# Patient Record
Sex: FEMALE | Race: WHITE | NOT HISPANIC OR LATINO | URBAN - METROPOLITAN AREA
[De-identification: names, ages, dates, MRNs, and addresses within clinical notes are randomized per-mention and may not be internally consistent; named-entity substitution may affect disease eponyms.]

---

## 2017-08-08 ENCOUNTER — OUTPATIENT (OUTPATIENT)
Dept: OUTPATIENT SERVICES | Facility: HOSPITAL | Age: 67
LOS: 1 days | Discharge: HOME | End: 2017-08-08

## 2017-08-08 DIAGNOSIS — E78.5 HYPERLIPIDEMIA, UNSPECIFIED: ICD-10-CM

## 2017-08-08 DIAGNOSIS — I10 ESSENTIAL (PRIMARY) HYPERTENSION: ICD-10-CM

## 2017-08-08 DIAGNOSIS — E55.9 VITAMIN D DEFICIENCY, UNSPECIFIED: ICD-10-CM

## 2017-08-21 ENCOUNTER — APPOINTMENT (OUTPATIENT)
Dept: HEMATOLOGY ONCOLOGY | Facility: CLINIC | Age: 67
End: 2017-08-21

## 2017-08-21 VITALS
BODY MASS INDEX: 29.25 KG/M2 | HEIGHT: 66 IN | TEMPERATURE: 97.7 F | RESPIRATION RATE: 14 BRPM | SYSTOLIC BLOOD PRESSURE: 122 MMHG | HEART RATE: 58 BPM | DIASTOLIC BLOOD PRESSURE: 63 MMHG | WEIGHT: 182 LBS

## 2017-08-21 DIAGNOSIS — Z78.9 OTHER SPECIFIED HEALTH STATUS: ICD-10-CM

## 2017-08-21 DIAGNOSIS — H35.30 UNSPECIFIED MACULAR DEGENERATION: ICD-10-CM

## 2017-08-21 DIAGNOSIS — Z87.891 PERSONAL HISTORY OF NICOTINE DEPENDENCE: ICD-10-CM

## 2017-08-21 DIAGNOSIS — Z86.69 PERSONAL HISTORY OF OTHER DISEASES OF THE NERVOUS SYSTEM AND SENSE ORGANS: ICD-10-CM

## 2017-08-21 DIAGNOSIS — K44.9 DIAPHRAGMATIC HERNIA W/OUT OBSTRUCTION OR GANGRENE: ICD-10-CM

## 2017-08-21 DIAGNOSIS — Z80.1 FAMILY HISTORY OF MALIGNANT NEOPLASM OF TRACHEA, BRONCHUS AND LUNG: ICD-10-CM

## 2017-08-21 DIAGNOSIS — Z85.828 PERSONAL HISTORY OF OTHER MALIGNANT NEOPLASM OF SKIN: ICD-10-CM

## 2017-08-21 DIAGNOSIS — K21.9 GASTRO-ESOPHAGEAL REFLUX DISEASE W/OUT ESOPHAGITIS: ICD-10-CM

## 2017-08-21 DIAGNOSIS — Z86.718 PERSONAL HISTORY OF OTHER VENOUS THROMBOSIS AND EMBOLISM: ICD-10-CM

## 2017-08-21 RX ORDER — GABAPENTIN 300 MG/1
300 CAPSULE ORAL
Refills: 0 | Status: ACTIVE | COMMUNITY

## 2017-08-21 RX ORDER — MULTIVITAMIN
TABLET ORAL
Refills: 0 | Status: ACTIVE | COMMUNITY

## 2017-08-21 RX ORDER — VIT C/E/ZN/COPPR/LUTEIN/ZEAXAN 250MG-90MG
CAPSULE ORAL
Refills: 0 | Status: ACTIVE | COMMUNITY

## 2017-08-21 RX ORDER — ROSUVASTATIN CALCIUM 10 MG/1
10 TABLET, FILM COATED ORAL
Refills: 0 | Status: ACTIVE | COMMUNITY

## 2017-08-21 RX ORDER — SERTRALINE HYDROCHLORIDE 25 MG/1
25 TABLET, FILM COATED ORAL
Refills: 0 | Status: ACTIVE | COMMUNITY

## 2017-08-21 RX ORDER — BENAZEPRIL HYDROCHLORIDE 10 MG/1
10 TABLET ORAL
Refills: 0 | Status: ACTIVE | COMMUNITY

## 2017-08-22 LAB
BASOPHILS # BLD: 0.02 TH/MM3
BASOPHILS NFR BLD: 0.2 %
EOSINOPHIL # BLD: 0.08 TH/MM3
EOSINOPHIL NFR BLD: 1 %
ERYTHROCYTE [DISTWIDTH] IN BLOOD BY AUTOMATED COUNT: 13.8 %
GRANULOCYTES # BLD: 5.69 TH/MM3
GRANULOCYTES NFR BLD: 68.2 %
HCT VFR BLD AUTO: 37.1 %
HGB BLD-MCNC: 11.9 G/DL
IMM GRANULOCYTES # BLD: 0.01 TH/MM3
IMM GRANULOCYTES NFR BLD: 0.1 %
LACTATE DEHYDROGENASE (NORTH): 226 IU/L
LYMPHOCYTES # BLD: 1.64 TH/MM3
LYMPHOCYTES NFR BLD: 19.7 %
MCH RBC QN AUTO: 28.7 PG
MCHC RBC AUTO-ENTMCNC: 32.1 G/DL
MCV RBC AUTO: 89.6 FL
MONOCYTES # BLD: 0.9 TH/MM3
MONOCYTES NFR BLD: 10.8 %
PLATELET # BLD: 250 TH/MM3
PMV BLD AUTO: 9.2 FL
RBC # BLD AUTO: 4.14 MIL/MM3
WBC # BLD: 8.34 TH/MM3

## 2017-08-25 ENCOUNTER — APPOINTMENT (OUTPATIENT)
Dept: HEMATOLOGY ONCOLOGY | Facility: CLINIC | Age: 67
End: 2017-08-25

## 2017-08-25 ENCOUNTER — RESULT REVIEW (OUTPATIENT)
Age: 67
End: 2017-08-25

## 2017-08-25 ENCOUNTER — OTHER (OUTPATIENT)
Age: 67
End: 2017-08-25

## 2017-08-25 DIAGNOSIS — Z00.00 ENCOUNTER FOR GENERAL ADULT MEDICAL EXAMINATION W/OUT ABNORMAL FINDINGS: ICD-10-CM

## 2017-09-08 LAB
B2 MICROGLOB SERPL-MCNC: 1.4 MG/L
IGA FLD-MCNC: 91 MG/DL
IGG FLD-MCNC: 1160 MG/DL
IGM SERPL-MCNC: 65 MG/DL
KAPPA LC FREE SER-MCNC: 10.5 MG/L
KAPPA LC FREE/LAMBDA FREE SER: 1.36
LAMBDA LC FREE SERPL-MCNC: 7.7 MG/L
PROT PATTERN UR IFE-IMP: NOT DETECTED

## 2017-09-14 ENCOUNTER — OUTPATIENT (OUTPATIENT)
Dept: OUTPATIENT SERVICES | Facility: HOSPITAL | Age: 67
LOS: 1 days | Discharge: HOME | End: 2017-09-14

## 2017-09-14 ENCOUNTER — APPOINTMENT (OUTPATIENT)
Dept: HEMATOLOGY ONCOLOGY | Facility: CLINIC | Age: 67
End: 2017-09-14

## 2017-09-14 VITALS
RESPIRATION RATE: 14 BRPM | DIASTOLIC BLOOD PRESSURE: 65 MMHG | SYSTOLIC BLOOD PRESSURE: 142 MMHG | BODY MASS INDEX: 29.25 KG/M2 | HEART RATE: 58 BPM | TEMPERATURE: 97 F | HEIGHT: 66 IN | WEIGHT: 182 LBS

## 2017-09-14 DIAGNOSIS — D47.2 MONOCLONAL GAMMOPATHY: ICD-10-CM

## 2017-09-15 PROBLEM — D47.2 MONOCLONAL GAMMOPATHY: Status: ACTIVE | Noted: 2017-08-21

## 2017-09-18 DIAGNOSIS — D47.2 MONOCLONAL GAMMOPATHY: ICD-10-CM

## 2017-12-18 ENCOUNTER — TRANSCRIPTION ENCOUNTER (OUTPATIENT)
Age: 67
End: 2017-12-18

## 2018-01-16 ENCOUNTER — RESULT REVIEW (OUTPATIENT)
Age: 68
End: 2018-01-16

## 2018-01-16 ENCOUNTER — OUTPATIENT (OUTPATIENT)
Dept: OUTPATIENT SERVICES | Facility: HOSPITAL | Age: 68
LOS: 1 days | Discharge: HOME | End: 2018-01-16

## 2018-01-16 ENCOUNTER — APPOINTMENT (OUTPATIENT)
Dept: HEMATOLOGY ONCOLOGY | Facility: CLINIC | Age: 68
End: 2018-01-16

## 2018-01-16 VITALS
BODY MASS INDEX: 29.57 KG/M2 | TEMPERATURE: 97.1 F | HEART RATE: 62 BPM | SYSTOLIC BLOOD PRESSURE: 87 MMHG | WEIGHT: 184 LBS | DIASTOLIC BLOOD PRESSURE: 46 MMHG | HEIGHT: 66 IN

## 2018-01-17 DIAGNOSIS — D47.2 MONOCLONAL GAMMOPATHY: ICD-10-CM

## 2018-01-19 LAB
B2 GLYCOPROT1 IGA SER-ACNC: < 9 SAU
B2 GLYCOPROT1 IGG SER-ACNC: < 9 SGU
B2 GLYCOPROT1 IGM SER-ACNC: < 9 SMU
IGA FLD-MCNC: 86 MG/DL
IGG FLD-MCNC: 1250 MG/DL
IGM SERPL-MCNC: 55 MG/DL
KAPPA LC FREE SER-MCNC: 10.6 MG/L
KAPPA LC FREE/LAMBDA FREE SER: 1.38
LACTATE DEHYDROGENASE (NORTH): 178 IU/L
LAMBDA LC FREE SERPL-MCNC: 7.7 MG/L
TSH SERPL DL<=0.005 MIU/L-ACNC: 1 UIU/ML

## 2018-01-31 ENCOUNTER — TRANSCRIPTION ENCOUNTER (OUTPATIENT)
Age: 68
End: 2018-01-31

## 2018-02-19 ENCOUNTER — OUTPATIENT (OUTPATIENT)
Dept: OUTPATIENT SERVICES | Facility: HOSPITAL | Age: 68
LOS: 1 days | Discharge: HOME | End: 2018-02-19

## 2018-02-19 DIAGNOSIS — E78.2 MIXED HYPERLIPIDEMIA: ICD-10-CM

## 2018-02-19 DIAGNOSIS — I10 ESSENTIAL (PRIMARY) HYPERTENSION: ICD-10-CM

## 2018-05-15 ENCOUNTER — APPOINTMENT (OUTPATIENT)
Dept: HEMATOLOGY ONCOLOGY | Facility: CLINIC | Age: 68
End: 2018-05-15

## 2018-05-15 ENCOUNTER — LABORATORY RESULT (OUTPATIENT)
Age: 68
End: 2018-05-15

## 2018-05-15 ENCOUNTER — OUTPATIENT (OUTPATIENT)
Dept: OUTPATIENT SERVICES | Facility: HOSPITAL | Age: 68
LOS: 1 days | Discharge: HOME | End: 2018-05-15

## 2018-05-15 VITALS
TEMPERATURE: 96.7 F | BODY MASS INDEX: 30.37 KG/M2 | HEIGHT: 66 IN | DIASTOLIC BLOOD PRESSURE: 59 MMHG | SYSTOLIC BLOOD PRESSURE: 124 MMHG | HEART RATE: 55 BPM | WEIGHT: 189 LBS | RESPIRATION RATE: 16 BRPM

## 2018-05-15 LAB
HCT VFR BLD CALC: 38.9 %
HGB BLD-MCNC: 12.1 G/DL
MCHC RBC-ENTMCNC: 27.6 PG
MCHC RBC-ENTMCNC: 31.1 G/DL
MCV RBC AUTO: 88.8 FL
PLATELET # BLD AUTO: 258 K/UL
PMV BLD: 9.7 FL
RBC # BLD: 4.38 M/UL
RBC # FLD: 14.1 %
WBC # FLD AUTO: 8.91 K/UL

## 2018-05-16 LAB
ALBUMIN SERPL ELPH-MCNC: 4.5 G/DL
ALP BLD-CCNC: 62 U/L
ALT SERPL-CCNC: 20 U/L
ANION GAP SERPL CALC-SCNC: 14 MMOL/L
AST SERPL-CCNC: 17 U/L
BILIRUB SERPL-MCNC: <0.2 MG/DL
BUN SERPL-MCNC: 13 MG/DL
CALCIUM SERPL-MCNC: 10.4 MG/DL
CHLORIDE SERPL-SCNC: 103 MMOL/L
CO2 SERPL-SCNC: 26 MMOL/L
CREAT SERPL-MCNC: 0.7 MG/DL
FERRITIN SERPL-MCNC: 68 NG/ML
GLUCOSE SERPL-MCNC: 94 MG/DL
IRON SATN MFR SERPL: 29 %
IRON SERPL-MCNC: 94 UG/DL
LDH SERPL-CCNC: 216
POTASSIUM SERPL-SCNC: 5.1 MMOL/L
PROT SERPL-MCNC: 7.2 G/DL
SODIUM SERPL-SCNC: 143 MMOL/L
TIBC SERPL-MCNC: 327 UG/DL
UIBC SERPL-MCNC: 233 UG/DL

## 2018-05-18 LAB
ALBUMIN MFR SERPL ELPH: 59.9 %
ALBUMIN SERPL-MCNC: 4.4 G/DL
ALBUMIN/GLOB SERPL: 1.5 RATIO
ALPHA1 GLOB MFR SERPL ELPH: 4.4 %
ALPHA1 GLOB SERPL ELPH-MCNC: 0.3 G/DL
ALPHA2 GLOB MFR SERPL ELPH: 10 %
ALPHA2 GLOB SERPL ELPH-MCNC: 0.7 G/DL
B-GLOBULIN MFR SERPL ELPH: 9.8 %
B-GLOBULIN SERPL ELPH-MCNC: 0.7 G/DL
GAMMA GLOB FLD ELPH-MCNC: 1.2 G/DL
GAMMA GLOB MFR SERPL ELPH: 15.9 %
INTERPRETATION SERPL IEP-IMP: NORMAL
M PROTEIN MFR SERPL ELPH: 9.5 %
M PROTEIN SPEC IFE-MCNC: NORMAL
MONOCLON BAND OBS SERPL: 0.7 G/DL
PROT SERPL-MCNC: 7.3 G/DL
PROT SERPL-MCNC: 7.3 G/DL

## 2018-05-21 DIAGNOSIS — D47.2 MONOCLONAL GAMMOPATHY: ICD-10-CM

## 2018-07-24 ENCOUNTER — OUTPATIENT (OUTPATIENT)
Dept: OUTPATIENT SERVICES | Facility: HOSPITAL | Age: 68
LOS: 1 days | Discharge: HOME | End: 2018-07-24

## 2018-07-24 DIAGNOSIS — R06.00 DYSPNEA, UNSPECIFIED: ICD-10-CM

## 2018-07-26 PROBLEM — Z78.9 ALCOHOL USE: Status: ACTIVE | Noted: 2017-08-21

## 2018-08-29 ENCOUNTER — OUTPATIENT (OUTPATIENT)
Dept: OUTPATIENT SERVICES | Facility: HOSPITAL | Age: 68
LOS: 1 days | Discharge: HOME | End: 2018-08-29

## 2018-08-29 DIAGNOSIS — E55.9 VITAMIN D DEFICIENCY, UNSPECIFIED: ICD-10-CM

## 2018-08-29 DIAGNOSIS — K21.9 GASTRO-ESOPHAGEAL REFLUX DISEASE WITHOUT ESOPHAGITIS: ICD-10-CM

## 2018-08-29 DIAGNOSIS — I10 ESSENTIAL (PRIMARY) HYPERTENSION: ICD-10-CM

## 2018-08-29 DIAGNOSIS — E78.2 MIXED HYPERLIPIDEMIA: ICD-10-CM

## 2018-09-19 ENCOUNTER — OUTPATIENT (OUTPATIENT)
Dept: OUTPATIENT SERVICES | Facility: HOSPITAL | Age: 68
LOS: 1 days | Discharge: HOME | End: 2018-09-19

## 2018-09-19 DIAGNOSIS — E87.5 HYPERKALEMIA: ICD-10-CM

## 2018-11-13 ENCOUNTER — OUTPATIENT (OUTPATIENT)
Dept: OUTPATIENT SERVICES | Facility: HOSPITAL | Age: 68
LOS: 1 days | Discharge: HOME | End: 2018-11-13

## 2018-11-13 ENCOUNTER — APPOINTMENT (OUTPATIENT)
Dept: HEMATOLOGY ONCOLOGY | Facility: CLINIC | Age: 68
End: 2018-11-13

## 2018-11-13 ENCOUNTER — LABORATORY RESULT (OUTPATIENT)
Age: 68
End: 2018-11-13

## 2018-11-13 VITALS
SYSTOLIC BLOOD PRESSURE: 173 MMHG | HEIGHT: 66 IN | HEART RATE: 69 BPM | DIASTOLIC BLOOD PRESSURE: 75 MMHG | TEMPERATURE: 96.8 F | WEIGHT: 195 LBS | BODY MASS INDEX: 31.34 KG/M2

## 2018-11-14 LAB
ALBUMIN SERPL ELPH-MCNC: 4.7 G/DL
ALP BLD-CCNC: 74 U/L
ALT SERPL-CCNC: 22 U/L
ANION GAP SERPL CALC-SCNC: 16 MMOL/L
AST SERPL-CCNC: 19 U/L
BILIRUB SERPL-MCNC: 0.3 MG/DL
BUN SERPL-MCNC: 13 MG/DL
CALCIUM SERPL-MCNC: 10.2 MG/DL
CHLORIDE SERPL-SCNC: 98 MMOL/L
CO2 SERPL-SCNC: 25 MMOL/L
CREAT SERPL-MCNC: 0.6 MG/DL
GLUCOSE SERPL-MCNC: 82 MG/DL
HCT VFR BLD CALC: 37.9 %
HGB BLD-MCNC: 11.9 G/DL
LDH SERPL-CCNC: 216
MCHC RBC-ENTMCNC: 27.5 PG
MCHC RBC-ENTMCNC: 31.4 G/DL
MCV RBC AUTO: 87.5 FL
PLATELET # BLD AUTO: 269 K/UL
PMV BLD: 8.9 FL
POTASSIUM SERPL-SCNC: 4.4 MMOL/L
PROT SERPL-MCNC: 7.5 G/DL
RBC # BLD: 4.33 M/UL
RBC # FLD: 14.3 %
SODIUM SERPL-SCNC: 139 MMOL/L
WBC # FLD AUTO: 8.97 K/UL

## 2018-11-16 LAB
ALBUMIN MFR SERPL ELPH: 58.6 %
ALBUMIN SERPL-MCNC: 4.4 G/DL
ALBUMIN/GLOB SERPL: 1.4 RATIO
ALPHA1 GLOB MFR SERPL ELPH: 4.8 %
ALPHA1 GLOB SERPL ELPH-MCNC: 0.4 G/DL
ALPHA2 GLOB MFR SERPL ELPH: 10.7 %
ALPHA2 GLOB SERPL ELPH-MCNC: 0.8 G/DL
B-GLOBULIN MFR SERPL ELPH: 9.9 %
B-GLOBULIN SERPL ELPH-MCNC: 0.7 G/DL
DEPRECATED KAPPA LC FREE/LAMBDA SER: 1.26 RATIO
GAMMA GLOB FLD ELPH-MCNC: 1.2 G/DL
GAMMA GLOB MFR SERPL ELPH: 16 %
INTERPRETATION SERPL IEP-IMP: NORMAL
KAPPA LC CSF-MCNC: 0.74 MG/DL
KAPPA LC SERPL-MCNC: 0.93 MG/DL
M PROTEIN MFR SERPL ELPH: 8.6 %
M PROTEIN SPEC IFE-MCNC: NORMAL
MONOCLON BAND OBS SERPL: 0.6 G/DL
PROT SERPL-MCNC: 7.5 G/DL
PROT SERPL-MCNC: 7.5 G/DL

## 2018-11-25 NOTE — PHYSICAL EXAM
[Fully active, able to carry on all pre-disease performance without restriction] : Status 0 - Fully active, able to carry on all pre-disease performance without restriction [Normal] : affect appropriate [de-identified] : well-healed scar in upper back s/p cyst removal

## 2018-11-25 NOTE — REVIEW OF SYSTEMS
[Palpitations] : palpitations [Negative] : Allergic/Immunologic [Chest Pain] : no chest pain [Leg Claudication] : no intermittent leg claudication [Lower Ext Edema] : no lower extremity edema

## 2018-11-25 NOTE — HISTORY OF PRESENT ILLNESS
[de-identified] : This is a 69 y/o white female who is here today for a follow up visit for MGUS.  In August 2017, she was sent in for initial consultation for monoclonal gammopathy by her primary care physician.  Patient was found to have mild anemia on routine CBC on 2 occasions(11.8 g/dL on 8/8/17). As a result, her PCP performed further w/u which showed an M-spike of 0.6 g/dL (IgG Kappa band). Her ferritin was 62 ng/mL, TIBC 359, Iron 54, folate >20 and B12 488. \par Pt's history is also significant for gastritis and GERD. She also takes NSAIDs for her OA. Last EGD was 5 years ago, which showed gastritis. \par \par  [de-identified] : She is here for follow up.\par Pt recently saw her ophthalmologist and has an abnormality which she is unable to describe.\par Her vision is normal at this time.

## 2018-11-27 DIAGNOSIS — D47.2 MONOCLONAL GAMMOPATHY: ICD-10-CM

## 2019-01-16 ENCOUNTER — EMERGENCY (EMERGENCY)
Facility: HOSPITAL | Age: 69
LOS: 0 days | Discharge: HOME | End: 2019-01-16
Attending: EMERGENCY MEDICINE | Admitting: EMERGENCY MEDICINE

## 2019-01-16 VITALS
OXYGEN SATURATION: 98 % | WEIGHT: 184.97 LBS | DIASTOLIC BLOOD PRESSURE: 64 MMHG | HEIGHT: 66 IN | HEART RATE: 61 BPM | RESPIRATION RATE: 16 BRPM | TEMPERATURE: 97 F | SYSTOLIC BLOOD PRESSURE: 142 MMHG

## 2019-01-16 DIAGNOSIS — K52.9 NONINFECTIVE GASTROENTERITIS AND COLITIS, UNSPECIFIED: ICD-10-CM

## 2019-01-16 DIAGNOSIS — K59.00 CONSTIPATION, UNSPECIFIED: ICD-10-CM

## 2019-01-16 DIAGNOSIS — E78.5 HYPERLIPIDEMIA, UNSPECIFIED: ICD-10-CM

## 2019-01-16 DIAGNOSIS — R10.9 UNSPECIFIED ABDOMINAL PAIN: ICD-10-CM

## 2019-01-16 DIAGNOSIS — Z79.2 LONG TERM (CURRENT) USE OF ANTIBIOTICS: ICD-10-CM

## 2019-01-16 DIAGNOSIS — Z79.899 OTHER LONG TERM (CURRENT) DRUG THERAPY: ICD-10-CM

## 2019-01-16 DIAGNOSIS — Z90.710 ACQUIRED ABSENCE OF BOTH CERVIX AND UTERUS: ICD-10-CM

## 2019-01-16 DIAGNOSIS — I10 ESSENTIAL (PRIMARY) HYPERTENSION: ICD-10-CM

## 2019-01-16 DIAGNOSIS — Z79.1 LONG TERM (CURRENT) USE OF NON-STEROIDAL ANTI-INFLAMMATORIES (NSAID): ICD-10-CM

## 2019-01-16 DIAGNOSIS — Z91.041 RADIOGRAPHIC DYE ALLERGY STATUS: ICD-10-CM

## 2019-01-16 LAB
ALBUMIN SERPL ELPH-MCNC: 4.8 G/DL — SIGNIFICANT CHANGE UP (ref 3.5–5.2)
ALP SERPL-CCNC: 82 U/L — SIGNIFICANT CHANGE UP (ref 30–115)
ALT FLD-CCNC: 23 U/L — SIGNIFICANT CHANGE UP (ref 0–41)
ANION GAP SERPL CALC-SCNC: 14 MMOL/L — SIGNIFICANT CHANGE UP (ref 7–14)
APPEARANCE UR: CLEAR — SIGNIFICANT CHANGE UP
AST SERPL-CCNC: 19 U/L — SIGNIFICANT CHANGE UP (ref 0–41)
BACTERIA # UR AUTO: ABNORMAL
BASOPHILS # BLD AUTO: 0.04 K/UL — SIGNIFICANT CHANGE UP (ref 0–0.2)
BASOPHILS NFR BLD AUTO: 0.4 % — SIGNIFICANT CHANGE UP (ref 0–1)
BILIRUB SERPL-MCNC: 0.3 MG/DL — SIGNIFICANT CHANGE UP (ref 0.2–1.2)
BILIRUB UR-MCNC: NEGATIVE — SIGNIFICANT CHANGE UP
BUN SERPL-MCNC: 13 MG/DL — SIGNIFICANT CHANGE UP (ref 10–20)
CALCIUM SERPL-MCNC: 10.3 MG/DL — HIGH (ref 8.5–10.1)
CHLORIDE SERPL-SCNC: 101 MMOL/L — SIGNIFICANT CHANGE UP (ref 98–110)
CO2 SERPL-SCNC: 27 MMOL/L — SIGNIFICANT CHANGE UP (ref 17–32)
COD CRY URNS QL: NEGATIVE — SIGNIFICANT CHANGE UP
COLOR SPEC: YELLOW — SIGNIFICANT CHANGE UP
CREAT SERPL-MCNC: 0.7 MG/DL — SIGNIFICANT CHANGE UP (ref 0.7–1.5)
DIFF PNL FLD: NEGATIVE — SIGNIFICANT CHANGE UP
EOSINOPHIL # BLD AUTO: 0.11 K/UL — SIGNIFICANT CHANGE UP (ref 0–0.7)
EOSINOPHIL NFR BLD AUTO: 1 % — SIGNIFICANT CHANGE UP (ref 0–8)
EPI CELLS # UR: ABNORMAL /HPF
GLUCOSE SERPL-MCNC: 94 MG/DL — SIGNIFICANT CHANGE UP (ref 70–99)
GLUCOSE UR QL: NEGATIVE MG/DL — SIGNIFICANT CHANGE UP
GRAN CASTS # UR COMP ASSIST: NEGATIVE — SIGNIFICANT CHANGE UP
HCT VFR BLD CALC: 41.6 % — SIGNIFICANT CHANGE UP (ref 37–47)
HGB BLD-MCNC: 13.3 G/DL — SIGNIFICANT CHANGE UP (ref 12–16)
HYALINE CASTS # UR AUTO: NEGATIVE — SIGNIFICANT CHANGE UP
IMM GRANULOCYTES NFR BLD AUTO: 0.4 % — HIGH (ref 0.1–0.3)
KETONES UR-MCNC: NEGATIVE — SIGNIFICANT CHANGE UP
LACTATE SERPL-SCNC: 0.8 MMOL/L — SIGNIFICANT CHANGE UP (ref 0.5–2.2)
LEUKOCYTE ESTERASE UR-ACNC: ABNORMAL
LIDOCAIN IGE QN: 28 U/L — SIGNIFICANT CHANGE UP (ref 7–60)
LYMPHOCYTES # BLD AUTO: 1.63 K/UL — SIGNIFICANT CHANGE UP (ref 1.2–3.4)
LYMPHOCYTES # BLD AUTO: 14.8 % — LOW (ref 20.5–51.1)
MAGNESIUM SERPL-MCNC: 1.9 MG/DL — SIGNIFICANT CHANGE UP (ref 1.8–2.4)
MCHC RBC-ENTMCNC: 27.7 PG — SIGNIFICANT CHANGE UP (ref 27–31)
MCHC RBC-ENTMCNC: 32 G/DL — SIGNIFICANT CHANGE UP (ref 32–37)
MCV RBC AUTO: 86.7 FL — SIGNIFICANT CHANGE UP (ref 81–99)
MONOCYTES # BLD AUTO: 0.79 K/UL — HIGH (ref 0.1–0.6)
MONOCYTES NFR BLD AUTO: 7.2 % — SIGNIFICANT CHANGE UP (ref 1.7–9.3)
NEUTROPHILS # BLD AUTO: 8.42 K/UL — HIGH (ref 1.4–6.5)
NEUTROPHILS NFR BLD AUTO: 76.2 % — HIGH (ref 42.2–75.2)
NITRITE UR-MCNC: NEGATIVE — SIGNIFICANT CHANGE UP
NRBC # BLD: 0 /100 WBCS — SIGNIFICANT CHANGE UP (ref 0–0)
PH UR: 5.5 — SIGNIFICANT CHANGE UP (ref 5–8)
PHOSPHATE SERPL-MCNC: 3.7 MG/DL — SIGNIFICANT CHANGE UP (ref 2.1–4.9)
PLATELET # BLD AUTO: 340 K/UL — SIGNIFICANT CHANGE UP (ref 130–400)
POTASSIUM SERPL-MCNC: 4.1 MMOL/L — SIGNIFICANT CHANGE UP (ref 3.5–5)
POTASSIUM SERPL-SCNC: 4.1 MMOL/L — SIGNIFICANT CHANGE UP (ref 3.5–5)
PROT SERPL-MCNC: 8 G/DL — SIGNIFICANT CHANGE UP (ref 6–8)
PROT UR-MCNC: NEGATIVE MG/DL — SIGNIFICANT CHANGE UP
RBC # BLD: 4.8 M/UL — SIGNIFICANT CHANGE UP (ref 4.2–5.4)
RBC # FLD: 14.7 % — HIGH (ref 11.5–14.5)
RBC CASTS # UR COMP ASSIST: SIGNIFICANT CHANGE UP /HPF
SODIUM SERPL-SCNC: 142 MMOL/L — SIGNIFICANT CHANGE UP (ref 135–146)
SP GR SPEC: 1.02 — SIGNIFICANT CHANGE UP (ref 1.01–1.03)
TRI-PHOS CRY UR QL COMP ASSIST: NEGATIVE — SIGNIFICANT CHANGE UP
TROPONIN T SERPL-MCNC: <0.01 NG/ML — SIGNIFICANT CHANGE UP
URATE CRY FLD QL MICRO: NEGATIVE — SIGNIFICANT CHANGE UP
UROBILINOGEN FLD QL: 0.2 MG/DL — SIGNIFICANT CHANGE UP (ref 0.2–0.2)
WBC # BLD: 11.03 K/UL — HIGH (ref 4.8–10.8)
WBC # FLD AUTO: 11.03 K/UL — HIGH (ref 4.8–10.8)
WBC UR QL: ABNORMAL /HPF

## 2019-01-16 RX ORDER — METRONIDAZOLE 500 MG
500 TABLET ORAL ONCE
Qty: 0 | Refills: 0 | Status: COMPLETED | OUTPATIENT
Start: 2019-01-16 | End: 2019-01-16

## 2019-01-16 RX ORDER — METRONIDAZOLE 500 MG
1 TABLET ORAL
Qty: 40 | Refills: 0
Start: 2019-01-16 | End: 2019-01-25

## 2019-01-16 RX ORDER — DIPHENHYDRAMINE HCL 50 MG
50 CAPSULE ORAL ONCE
Qty: 0 | Refills: 0 | Status: COMPLETED | OUTPATIENT
Start: 2019-01-16 | End: 2019-01-16

## 2019-01-16 RX ORDER — FAMOTIDINE 10 MG/ML
20 INJECTION INTRAVENOUS ONCE
Qty: 0 | Refills: 0 | Status: COMPLETED | OUTPATIENT
Start: 2019-01-16 | End: 2019-01-16

## 2019-01-16 RX ORDER — SODIUM CHLORIDE 9 MG/ML
2000 INJECTION INTRAMUSCULAR; INTRAVENOUS; SUBCUTANEOUS ONCE
Qty: 0 | Refills: 0 | Status: COMPLETED | OUTPATIENT
Start: 2019-01-16 | End: 2019-01-16

## 2019-01-16 RX ORDER — IBUPROFEN 200 MG
1 TABLET ORAL
Qty: 20 | Refills: 0
Start: 2019-01-16

## 2019-01-16 RX ORDER — ONDANSETRON 8 MG/1
1 TABLET, FILM COATED ORAL
Qty: 20 | Refills: 0
Start: 2019-01-16

## 2019-01-16 RX ORDER — CIPROFLOXACIN LACTATE 400MG/40ML
500 VIAL (ML) INTRAVENOUS ONCE
Qty: 0 | Refills: 0 | Status: COMPLETED | OUTPATIENT
Start: 2019-01-16 | End: 2019-01-16

## 2019-01-16 RX ORDER — LIDOCAINE 4 G/100G
10 CREAM TOPICAL ONCE
Qty: 0 | Refills: 0 | Status: COMPLETED | OUTPATIENT
Start: 2019-01-16 | End: 2019-01-16

## 2019-01-16 RX ORDER — CIPROFLOXACIN LACTATE 400MG/40ML
1 VIAL (ML) INTRAVENOUS
Qty: 20 | Refills: 0
Start: 2019-01-16 | End: 2019-01-25

## 2019-01-16 RX ADMIN — Medication 30 MILLILITER(S): at 19:48

## 2019-01-16 RX ADMIN — LIDOCAINE 10 MILLILITER(S): 4 CREAM TOPICAL at 19:48

## 2019-01-16 RX ADMIN — Medication 500 MILLIGRAM(S): at 22:24

## 2019-01-16 RX ADMIN — Medication 125 MILLIGRAM(S): at 19:37

## 2019-01-16 RX ADMIN — FAMOTIDINE 20 MILLIGRAM(S): 10 INJECTION INTRAVENOUS at 19:37

## 2019-01-16 RX ADMIN — Medication 500 MILLIGRAM(S): at 22:25

## 2019-01-16 RX ADMIN — Medication 50 MILLIGRAM(S): at 19:37

## 2019-01-16 RX ADMIN — SODIUM CHLORIDE 2000 MILLILITER(S): 9 INJECTION INTRAMUSCULAR; INTRAVENOUS; SUBCUTANEOUS at 19:15

## 2019-01-16 NOTE — ED ADULT NURSE NOTE - CHIEF COMPLAINT QUOTE
AS per patient:'I just came from [facility] [place]. I have belly pain."

## 2019-01-16 NOTE — ED PROVIDER NOTE - PLAN OF CARE
68f w abdominal pain and L sided tenderness concerning for colitis/diverticulitis. nontoxic appearing, n/v intact. --CBC, CMP, EKG, lipase, lactate, UA, CT abd/pelvis r/o abscess/perf. --IV fluids, analgesia/antiemetics as needed, observe/re-assess

## 2019-01-16 NOTE — ED ADULT NURSE NOTE - NSIMPLEMENTINTERV_GEN_ALL_ED
Implemented All Universal Safety Interventions:  [place] to call system. Call bell, personal items and telephone within reach. Instruct patient to call for assistance. Room bathroom lighting operational. Non-slip footwear when patient is off stretcher. Physically safe environment: no spills, clutter or unnecessary equipment. Stretcher in lowest position, wheels locked, appropriate side rails in place.

## 2019-01-16 NOTE — ED PROVIDER NOTE - NS ED ROS FT
Review of Systems  Constitutional:  No fever or chills.  Eyes:  Negative.   ENMT:  No nasal congestion, discharge, or throat pain.   Cardiac:  No chest pain, syncope, or edema.  Respiratory:  No dyspnea, wheezing, or cough. No hemoptysis.  GI:  See HPI.  :  No dysuria or hematuria. No vaginal bleeding/discharge.  Musculoskeletal:  No joint swelling, joint pain, or back pain.  Skin:  No skin rash, jaundice, or lesions.  Neuro:  No headache, loss of sensation, or focal weakness.  No change in mental status.

## 2019-01-16 NOTE — ED PROVIDER NOTE - CARE PLAN
Principal Discharge DX:	Enteritis  Secondary Diagnosis:	Abdominal pain Principal Discharge DX:	Enteritis  Assessment and plan of treatment:	68f w abdominal pain and L sided tenderness concerning for colitis/diverticulitis. nontoxic appearing, n/v intact. --CBC, CMP, EKG, lipase, lactate, UA, CT abd/pelvis r/o abscess/perf. --IV fluids, analgesia/antiemetics as needed, observe/re-assess  Secondary Diagnosis:	Abdominal pain

## 2019-01-16 NOTE — ED PROVIDER NOTE - NSFOLLOWUPCLINICS_GEN_ALL_ED_FT
A Family Medicine Doctor  Family Medicine  .  [place]   Phone:   Fax:   Follow Up Time: 1-3 Days    A Gastroenterologist  Gastroenterology  .  [place]   Phone:   Fax:   Follow Up Time: 1-3 Days    [facility]  Medicine  [address] [place] [address]   Phone: [phone]  Fax:   Follow Up Time: 1-3 Days

## 2019-01-16 NOTE — ED PROVIDER NOTE - PHYSICAL EXAMINATION
Physical Exam  General: Awake, alert, NAD, WDWN, non-toxic appearing, NCAT  Eyes: PERRL, EOMI, no icterus, lids and conjunctivae are normal  ENT: External inspection normal, pink/moist membranes, pharynx normal  CV: S1S2, regular rate and rhythm, no murmur/gallops/rubs, no JVD, 2+ pulses b/l, no edema/cords/homans, warm/well-perfused  Respiratory: Normal respiratory rate/effort, no respiratory distress, normal voice, speaking full sentences, lungs clear to auscultation b/l, no wheezing/rales/rhonchi, no retractions, no stridor  Abdomen: Soft abdomen, L sided tender, no distended/guarding/rebound, no CVA tender  Musculoskeletal: FROM all 4 extremities, N/V intact  Neck: FROM neck, supple, no meningismus, trachea midline, no JVD  Integumentary: Color normal for race, warm and dry, no rash  Neuro: Oriented x3, CN 2-12 grossly intact, normal motor, normal sensory  Psych: Oriented x3, mood normal, affect normal

## 2019-01-16 NOTE — ED PROVIDER NOTE - MEDICAL DECISION MAKING DETAILS
68f w abdominal pain and L sided tenderness. Labs & imaging reviewed. Analgesia, IV fluids, & Abx given. Pt feeling well at time of DC; tolerating PO intake in ED. Pt advised regarding symptomatic/supportive care, importance of PMD/GI f/u, and symptoms to prompt ED return. Copy of results given to patient.

## 2019-01-16 NOTE — ED PROVIDER NOTE - OBJECTIVE STATEMENT
68f w a hx of HTN & HLD reports a few days of diffuse abdominal pain. Pain is sharp, moderate, constant, no radiating, no exacerbating/alleviating. Pt reports that for the past few days she had diarrhea (no black/bloody stools) but now feels constipated today. Normal flatus. No vomiting. Pt sent in by [facility] for further eval.

## 2019-01-17 DIAGNOSIS — Z98.890 OTHER SPECIFIED POSTPROCEDURAL STATES: Chronic | ICD-10-CM

## 2019-01-17 DIAGNOSIS — Z90.710 ACQUIRED ABSENCE OF BOTH CERVIX AND UTERUS: Chronic | ICD-10-CM

## 2019-05-13 ENCOUNTER — OUTPATIENT (OUTPATIENT)
Dept: OUTPATIENT SERVICES | Facility: HOSPITAL | Age: 69
LOS: 1 days | Discharge: HOME | End: 2019-05-13

## 2019-05-13 ENCOUNTER — APPOINTMENT (OUTPATIENT)
Dept: HEMATOLOGY ONCOLOGY | Facility: CLINIC | Age: 69
End: 2019-05-13

## 2019-05-13 ENCOUNTER — LABORATORY RESULT (OUTPATIENT)
Age: 69
End: 2019-05-13

## 2019-05-13 VITALS
TEMPERATURE: 97.1 F | WEIGHT: 193 LBS | BODY MASS INDEX: 34.2 KG/M2 | DIASTOLIC BLOOD PRESSURE: 53 MMHG | HEIGHT: 63 IN | RESPIRATION RATE: 16 BRPM | HEART RATE: 55 BPM | SYSTOLIC BLOOD PRESSURE: 106 MMHG

## 2019-05-13 DIAGNOSIS — Z98.890 OTHER SPECIFIED POSTPROCEDURAL STATES: Chronic | ICD-10-CM

## 2019-05-13 DIAGNOSIS — Z90.710 ACQUIRED ABSENCE OF BOTH CERVIX AND UTERUS: Chronic | ICD-10-CM

## 2019-05-14 DIAGNOSIS — D47.2 MONOCLONAL GAMMOPATHY: ICD-10-CM

## 2019-05-14 PROBLEM — I10 ESSENTIAL (PRIMARY) HYPERTENSION: Chronic | Status: ACTIVE | Noted: 2019-01-17

## 2019-05-14 PROBLEM — E78.5 HYPERLIPIDEMIA, UNSPECIFIED: Chronic | Status: ACTIVE | Noted: 2019-01-17

## 2019-05-14 LAB
ALBUMIN SERPL ELPH-MCNC: 4.4 G/DL
ALP BLD-CCNC: 64 U/L
ALT SERPL-CCNC: 20 U/L
ANION GAP SERPL CALC-SCNC: 12 MMOL/L
AST SERPL-CCNC: 18 U/L
BILIRUB SERPL-MCNC: <0.2 MG/DL
BUN SERPL-MCNC: 19 MG/DL
CALCIUM SERPL-MCNC: 9.7 MG/DL
CHLORIDE SERPL-SCNC: 105 MMOL/L
CO2 SERPL-SCNC: 25 MMOL/L
CREAT SERPL-MCNC: 0.7 MG/DL
DEPRECATED KAPPA LC FREE/LAMBDA SER: 1.67 RATIO
GLUCOSE SERPL-MCNC: 100 MG/DL
HCT VFR BLD CALC: 37.3 %
HGB BLD-MCNC: 11.7 G/DL
KAPPA LC CSF-MCNC: 0.63 MG/DL
KAPPA LC SERPL-MCNC: 1.05 MG/DL
LDH SERPL-CCNC: 231
MCHC RBC-ENTMCNC: 27.7 PG
MCHC RBC-ENTMCNC: 31.4 G/DL
MCV RBC AUTO: 88.4 FL
PLATELET # BLD AUTO: 238 K/UL
PMV BLD: 9.6 FL
POTASSIUM SERPL-SCNC: 4.9 MMOL/L
PROT SERPL-MCNC: 7 G/DL
RBC # BLD: 4.22 M/UL
RBC # FLD: 14.7 %
SODIUM SERPL-SCNC: 142 MMOL/L
WBC # FLD AUTO: 6.5 K/UL

## 2019-05-15 LAB
ALBUMIN MFR SERPL ELPH: 60.7 %
ALBUMIN SERPL-MCNC: 4.2 G/DL
ALBUMIN/GLOB SERPL: 1.5 RATIO
ALPHA1 GLOB MFR SERPL ELPH: 4.2 %
ALPHA1 GLOB SERPL ELPH-MCNC: 0.3 G/DL
ALPHA2 GLOB MFR SERPL ELPH: 9.5 %
ALPHA2 GLOB SERPL ELPH-MCNC: 0.7 G/DL
B-GLOBULIN MFR SERPL ELPH: 9.9 %
B-GLOBULIN SERPL ELPH-MCNC: 0.7 G/DL
GAMMA GLOB FLD ELPH-MCNC: 1.1 G/DL
GAMMA GLOB MFR SERPL ELPH: 15.7 %
INTERPRETATION SERPL IEP-IMP: NORMAL
M PROTEIN MFR SERPL ELPH: 9.4 %
M PROTEIN SPEC IFE-MCNC: NORMAL
MONOCLON BAND OBS SERPL: 0.7 G/DL
PROT SERPL-MCNC: 7 G/DL
PROT SERPL-MCNC: 7 G/DL

## 2019-05-17 NOTE — HISTORY OF PRESENT ILLNESS
[de-identified] : This is a 69 y/o white female who is here today for a follow up visit for MGUS.  In August 2017, she was sent in for initial consultation for monoclonal gammopathy by her primary care physician.  Patient was found to have mild anemia on routine CBC on 2 occasions(11.8 g/dL on 8/8/17). As a result, her PCP performed further w/u which showed an M-spike of 0.6 g/dL (IgG Kappa band). Her ferritin was 62 ng/mL, TIBC 359, Iron 54, folate >20 and B12 488. \par Pt's history is also significant for gastritis and GERD. She also takes NSAIDs for her OA. Last EGD was 5 years ago, which showed gastritis. \par \par  [de-identified] : She is here for follow up.\par Pt recently saw her ophthalmologist and has an abnormality which she is unable to describe.\par Her vision is normal at this time.\par \par 5/13/19:\par Dxed with enteritis in Jan 2019. \par Last colonoscopy 6-7yrs ago. Scheduled to see GI today.\par Denies any complaints currently. \par Denies fever, nausea, vomiting, chest pain, SOB, abdominal pain, bowel and bladder problems.\par Uptodate with mammo. \par

## 2019-05-17 NOTE — REVIEW OF SYSTEMS
[Palpitations] : palpitations [Negative] : Allergic/Immunologic [Chest Pain] : no chest pain [Lower Ext Edema] : no lower extremity edema [Leg Claudication] : no intermittent leg claudication

## 2019-05-17 NOTE — ASSESSMENT
[FreeTextEntry1] : MGUS. \par \par PLAN\par Last myeloma panel has been stable with M spike 0.6.\par Blood work ordered: CBC, CMP, SPEP, IFES, FLC assay, LDH.\par GI f/u for colonoscopy. Uptodate with mammo. \par \par RTC in 6 months.\par Pt seen and examined with . \par

## 2019-05-17 NOTE — PHYSICAL EXAM
[Fully active, able to carry on all pre-disease performance without restriction] : Status 0 - Fully active, able to carry on all pre-disease performance without restriction [Normal] : affect appropriate [de-identified] : well-healed scar in upper back s/p cyst removal

## 2019-05-17 NOTE — RESULTS/DATA
[FreeTextEntry1] : EXAM:  CT ABDOMEN AND PELVIS IC      \par \par \par PROCEDURE DATE:  01/16/2019  \par \par \par \par \par INTERPRETATION:  CLINICAL HISTORY/REASON FOR EXAM: Diffuse abdominal pain.\par \par TECHNIQUE: Contiguous axial CT images were obtained from the lower chest \par to the pubic symphysis following administration of Optiray 320 \par intravenous contrast. Oral contrast was not administered. Reformatted \par images in the coronal and sagittal planes were acquired.\par \par COMPARISON: None.\par \par \par FINDINGS:\par \par LOWER CHEST: Bibasilar atelectasis.\par \par HEPATOBILIARY: Right inferior hepatic lobe subcentimeter hypodensity too \par small to characterize.\par \par SPLEEN: Unremarkable.\par \par PANCREAS: Unremarkable.\par \par ADRENAL GLANDS: Unremarkable.\par \par KIDNEYS: Symmetric pattern of renal enhancement. No hydronephrosis \par bilaterally. Right interpolar region 1 cm cyst and additional \par hypodensities bilaterally too small to characterize.\par \par ABDOMINOPELVIC NODES: Nonspecific prominent periaortic subcentimeter \par lymph nodes\par \par PELVIC ORGANS: Obscured secondary to streak artifact.\par \par PERITONEUM/MESENTERY/BOWEL: Wall thickening and multiple small bowel \par loops with trace abdominal/pelvic ascites. No bowel obstruction or \par pneumoperitoneum. Post appendectomy. Small hiatal hernia. Sigmoid \par diverticulosis without diverticulitis.\par \par BONES/SOFT TISSUES: Bilateral total hip arthroplasties. Degenerative \par changes of the spine, most pronounced at L3-L4. There is a small left \par fat-containing spelled gallium hernia spigelian hernia.\par \par The aorta is calcified.\par \par \par IMPRESSION:\par \par \par Findings consistent with enteritis.\par \par Nonspecific prominent periaortic lymph nodes may be reactive. Clinical \par correlation and follow-up is recommended.\par \par \par

## 2019-10-30 ENCOUNTER — OUTPATIENT (OUTPATIENT)
Dept: OUTPATIENT SERVICES | Facility: HOSPITAL | Age: 69
LOS: 1 days | Discharge: HOME | End: 2019-10-30

## 2019-10-30 DIAGNOSIS — Z98.890 OTHER SPECIFIED POSTPROCEDURAL STATES: Chronic | ICD-10-CM

## 2019-10-30 DIAGNOSIS — I10 ESSENTIAL (PRIMARY) HYPERTENSION: ICD-10-CM

## 2019-10-30 DIAGNOSIS — Z90.710 ACQUIRED ABSENCE OF BOTH CERVIX AND UTERUS: Chronic | ICD-10-CM

## 2019-10-30 DIAGNOSIS — K76.0 FATTY (CHANGE OF) LIVER, NOT ELSEWHERE CLASSIFIED: ICD-10-CM

## 2019-10-30 DIAGNOSIS — E55.9 VITAMIN D DEFICIENCY, UNSPECIFIED: ICD-10-CM

## 2019-10-30 DIAGNOSIS — E78.5 HYPERLIPIDEMIA, UNSPECIFIED: ICD-10-CM

## 2019-10-30 DIAGNOSIS — Z00.00 ENCOUNTER FOR GENERAL ADULT MEDICAL EXAMINATION WITHOUT ABNORMAL FINDINGS: ICD-10-CM

## 2019-12-16 ENCOUNTER — OUTPATIENT (OUTPATIENT)
Dept: OUTPATIENT SERVICES | Facility: HOSPITAL | Age: 69
LOS: 1 days | Discharge: HOME | End: 2019-12-16

## 2019-12-16 ENCOUNTER — LABORATORY RESULT (OUTPATIENT)
Age: 69
End: 2019-12-16

## 2019-12-16 ENCOUNTER — APPOINTMENT (OUTPATIENT)
Dept: HEMATOLOGY ONCOLOGY | Facility: CLINIC | Age: 69
End: 2019-12-16
Payer: MEDICARE

## 2019-12-16 VITALS
TEMPERATURE: 97.8 F | WEIGHT: 188 LBS | BODY MASS INDEX: 33.31 KG/M2 | HEART RATE: 53 BPM | HEIGHT: 63 IN | DIASTOLIC BLOOD PRESSURE: 62 MMHG | SYSTOLIC BLOOD PRESSURE: 130 MMHG

## 2019-12-16 DIAGNOSIS — Z98.890 OTHER SPECIFIED POSTPROCEDURAL STATES: Chronic | ICD-10-CM

## 2019-12-16 DIAGNOSIS — Z90.710 ACQUIRED ABSENCE OF BOTH CERVIX AND UTERUS: Chronic | ICD-10-CM

## 2019-12-16 PROCEDURE — 99213 OFFICE O/P EST LOW 20 MIN: CPT

## 2019-12-16 NOTE — HISTORY OF PRESENT ILLNESS
[de-identified] : This is a 69 y/o white female who is here today for a follow up visit for MGUS.  In August 2017, she was sent in for initial consultation for monoclonal gammopathy by her primary care physician.  Patient was found to have mild anemia on routine CBC on 2 occasions(11.8 g/dL on 8/8/17). As a result, her PCP performed further w/u which showed an M-spike of 0.6 g/dL (IgG Kappa band). Her ferritin was 62 ng/mL, TIBC 359, Iron 54, folate >20 and B12 488. \par Pt's history is also significant for gastritis and GERD. She also takes NSAIDs for her OA. Last EGD was 5 years ago, which showed gastritis. \par \par  [de-identified] : She is here for follow up.\par Pt recently saw her ophthalmologist and has an abnormality which she is unable to describe.\par Her vision is normal at this time.\par \par 5/13/19:\par Dxed with enteritis in Jan 2019. \par Last colonoscopy 6-7yrs ago. Scheduled to see GI today.\par Denies any complaints currently. \par Denies fever, nausea, vomiting, chest pain, SOB, abdominal pain, bowel and bladder problems.\par Uptodate with mammo. \par \par 12/16/19\par Pt is here for follow up of MGUS.\par Denies fever, nausea, vomiting, chest pain, SOB, abdominal pain, bowel and bladder problems.\par Uptodate with mammo ( 12/2/19) Reviewed. \par pt has been getting cardio w/u for bradycardia.\par C/o fatigue\par \par

## 2019-12-16 NOTE — PHYSICAL EXAM
[Fully active, able to carry on all pre-disease performance without restriction] : Status 0 - Fully active, able to carry on all pre-disease performance without restriction [Normal] : affect appropriate [de-identified] : well-healed scar in upper back s/p cyst removal

## 2019-12-16 NOTE — REVIEW OF SYSTEMS
[Palpitations] : palpitations [Negative] : Heme/Lymph [Chest Pain] : no chest pain [Leg Claudication] : no intermittent leg claudication [Lower Ext Edema] : no lower extremity edema

## 2019-12-16 NOTE — ASSESSMENT
[FreeTextEntry1] : IGG kappa MGUS. \par \par PLAN\par Last myeloma panel has been stable with M spike 0.7.\par Blood work ordered: CBC, CMP, SPEP, IFES, FLC assay, LDH.\par UTD with  colonoscopy. Uptodate with mammo. \par \par RTC in 6 months.\par

## 2019-12-17 LAB
ALBUMIN MFR SERPL ELPH: 60.7 %
ALBUMIN SERPL ELPH-MCNC: 4.6 G/DL
ALBUMIN SERPL-MCNC: 4.3 G/DL
ALBUMIN/GLOB SERPL: 1.5 RATIO
ALP BLD-CCNC: 58 U/L
ALPHA1 GLOB MFR SERPL ELPH: 4.5 %
ALPHA1 GLOB SERPL ELPH-MCNC: 0.3 G/DL
ALPHA2 GLOB MFR SERPL ELPH: 9.6 %
ALPHA2 GLOB SERPL ELPH-MCNC: 0.7 G/DL
ALT SERPL-CCNC: 20 U/L
ANION GAP SERPL CALC-SCNC: 14 MMOL/L
AST SERPL-CCNC: 19 U/L
B-GLOBULIN MFR SERPL ELPH: 9.8 %
B-GLOBULIN SERPL ELPH-MCNC: 0.7 G/DL
BILIRUB SERPL-MCNC: 0.2 MG/DL
BUN SERPL-MCNC: 13 MG/DL
CALCIUM SERPL-MCNC: 9.9 MG/DL
CHLORIDE SERPL-SCNC: 103 MMOL/L
CO2 SERPL-SCNC: 24 MMOL/L
CREAT SERPL-MCNC: 0.6 MG/DL
DEPRECATED KAPPA LC FREE/LAMBDA SER: 1.23 RATIO
GAMMA GLOB FLD ELPH-MCNC: 1.1 G/DL
GAMMA GLOB MFR SERPL ELPH: 15.4 %
GLUCOSE SERPL-MCNC: 85 MG/DL
HCT VFR BLD CALC: 36.5 %
HGB BLD-MCNC: 11.4 G/DL
IGA SER QL IEP: 94 MG/DL
IGG SER QL IEP: 1187 MG/DL
IGM SER QL IEP: 51 MG/DL
INTERPRETATION SERPL IEP-IMP: NORMAL
KAPPA LC CSF-MCNC: 0.78 MG/DL
KAPPA LC SERPL-MCNC: 0.96 MG/DL
M PROTEIN MFR SERPL ELPH: 6.5 %
M PROTEIN SPEC IFE-MCNC: NORMAL
MCHC RBC-ENTMCNC: 28.1 PG
MCHC RBC-ENTMCNC: 31.2 G/DL
MCV RBC AUTO: 89.9 FL
MONOCLON BAND OBS SERPL: 0.5 G/DL
PLATELET # BLD AUTO: 244 K/UL
PMV BLD: 9.8 FL
POTASSIUM SERPL-SCNC: 4.6 MMOL/L
PROT SERPL-MCNC: 7 G/DL
PROT SERPL-MCNC: 7.1 G/DL
PROT SERPL-MCNC: 7.1 G/DL
RBC # BLD: 4.06 M/UL
RBC # FLD: 14.5 %
SODIUM SERPL-SCNC: 141 MMOL/L
T4 FREE SERPL-MCNC: 0.9 NG/DL
TSH SERPL-ACNC: 1.77 UIU/ML
WBC # FLD AUTO: 6.21 K/UL

## 2019-12-23 DIAGNOSIS — D47.2 MONOCLONAL GAMMOPATHY: ICD-10-CM

## 2020-03-02 NOTE — ED PROVIDER NOTE - NSFOLLOWUPCLINICSTOKEN_GEN_ALL_ED_FT
Pending MBS - RN inquiring if pt needs NPO order. AxO x3 - forgetful. [id number]:1-3 Days;[id number]:1-3 Days;021219:1-3 Days;

## 2020-06-15 ENCOUNTER — LABORATORY RESULT (OUTPATIENT)
Age: 70
End: 2020-06-15

## 2020-06-15 ENCOUNTER — OUTPATIENT (OUTPATIENT)
Dept: OUTPATIENT SERVICES | Facility: HOSPITAL | Age: 70
LOS: 1 days | Discharge: HOME | End: 2020-06-15

## 2020-06-15 ENCOUNTER — APPOINTMENT (OUTPATIENT)
Dept: HEMATOLOGY ONCOLOGY | Facility: CLINIC | Age: 70
End: 2020-06-15
Payer: MEDICARE

## 2020-06-15 VITALS
BODY MASS INDEX: 31.71 KG/M2 | HEIGHT: 63 IN | SYSTOLIC BLOOD PRESSURE: 139 MMHG | WEIGHT: 179 LBS | DIASTOLIC BLOOD PRESSURE: 70 MMHG | HEART RATE: 71 BPM | TEMPERATURE: 97.6 F

## 2020-06-15 DIAGNOSIS — Z90.710 ACQUIRED ABSENCE OF BOTH CERVIX AND UTERUS: Chronic | ICD-10-CM

## 2020-06-15 DIAGNOSIS — Z98.890 OTHER SPECIFIED POSTPROCEDURAL STATES: Chronic | ICD-10-CM

## 2020-06-15 LAB
HCT VFR BLD CALC: 39.3 %
HGB BLD-MCNC: 12.3 G/DL
MCHC RBC-ENTMCNC: 28.1 PG
MCHC RBC-ENTMCNC: 31.3 G/DL
MCV RBC AUTO: 89.9 FL
PLATELET # BLD AUTO: 259 K/UL
PMV BLD: 9.3 FL
RBC # BLD: 4.37 M/UL
RBC # FLD: 14.2 %
WBC # FLD AUTO: 7.15 K/UL

## 2020-06-15 PROCEDURE — 99213 OFFICE O/P EST LOW 20 MIN: CPT

## 2020-06-15 NOTE — HISTORY OF PRESENT ILLNESS
[de-identified] : She is here for follow up.\par Pt recently saw her ophthalmologist and has an abnormality which she is unable to describe.\par Her vision is normal at this time.\par \par 5/13/19:\par Dxed with enteritis in Jan 2019. \par Last colonoscopy 6-7yrs ago. Scheduled to see GI today.\par Denies any complaints currently. \par Denies fever, nausea, vomiting, chest pain, SOB, abdominal pain, bowel and bladder problems.\par Uptodate with mammo. \par \par 12/16/19\par Pt is here for follow up of MGUS.\par Denies fever, nausea, vomiting, chest pain, SOB, abdominal pain, bowel and bladder problems.\par Uptodate with mammo ( 12/2/19) Reviewed. \par pt has been getting cardio w/u for bradycardia.\par C/o fatigue\par \par 6/15/20\par Pt is here for follow up visit.\par No new complaints. Denies bone pain\par Denies fever, nausea, vomiting, chest pain, SOB, abdominal pain, bowel and bladder problems.\par  [de-identified] : This is a 69 y/o white female who is here today for a follow up visit for MGUS.  In August 2017, she was sent in for initial consultation for monoclonal gammopathy by her primary care physician.  Patient was found to have mild anemia on routine CBC on 2 occasions(11.8 g/dL on 8/8/17). As a result, her PCP performed further w/u which showed an M-spike of 0.6 g/dL (IgG Kappa band). Her ferritin was 62 ng/mL, TIBC 359, Iron 54, folate >20 and B12 488. \par Pt's history is also significant for gastritis and GERD. She also takes NSAIDs for her OA. Last EGD was 5 years ago, which showed gastritis. \par \par

## 2020-06-15 NOTE — ASSESSMENT
[FreeTextEntry1] : IGG kappa MGUS. \par \par PLAN\par Last myeloma panel has been stable with M spike 0.5\par Blood work ordered: CBC, CMP, SPEP, IFES, FLC assay, LDH.\par UTD with  colonoscopy. Uptodate with mammo. \par \par RTC in 6 months.\par

## 2020-06-15 NOTE — PHYSICAL EXAM
[Fully active, able to carry on all pre-disease performance without restriction] : Status 0 - Fully active, able to carry on all pre-disease performance without restriction [Normal] : grossly intact [de-identified] : well-healed scar in upper back s/p cyst removal

## 2020-06-16 LAB
ALBUMIN MFR SERPL ELPH: 61.5 %
ALBUMIN SERPL ELPH-MCNC: 4.8 G/DL
ALBUMIN SERPL-MCNC: 4.5 G/DL
ALBUMIN/GLOB SERPL: 1.6 RATIO
ALP BLD-CCNC: 59 U/L
ALPHA1 GLOB MFR SERPL ELPH: 4 %
ALPHA1 GLOB SERPL ELPH-MCNC: 0.3 G/DL
ALPHA2 GLOB MFR SERPL ELPH: 9.1 %
ALPHA2 GLOB SERPL ELPH-MCNC: 0.7 G/DL
ALT SERPL-CCNC: 23 U/L
ANION GAP SERPL CALC-SCNC: 12 MMOL/L
AST SERPL-CCNC: 23 U/L
B-GLOBULIN MFR SERPL ELPH: 9.7 %
B-GLOBULIN SERPL ELPH-MCNC: 0.7 G/DL
BILIRUB SERPL-MCNC: 0.2 MG/DL
BUN SERPL-MCNC: 15 MG/DL
CALCIUM SERPL-MCNC: 9.8 MG/DL
CHLORIDE SERPL-SCNC: 102 MMOL/L
CO2 SERPL-SCNC: 27 MMOL/L
CREAT SERPL-MCNC: 0.8 MG/DL
DEPRECATED KAPPA LC FREE/LAMBDA SER: 1.78 RATIO
GAMMA GLOB FLD ELPH-MCNC: 1.1 G/DL
GAMMA GLOB MFR SERPL ELPH: 15.7 %
GLUCOSE SERPL-MCNC: 108 MG/DL
IGA SER QL IEP: 77 MG/DL
IGG SER QL IEP: 1129 MG/DL
IGM SER QL IEP: 55 MG/DL
INTERPRETATION SERPL IEP-IMP: NORMAL
KAPPA LC CSF-MCNC: 0.64 MG/DL
KAPPA LC SERPL-MCNC: 1.14 MG/DL
M PROTEIN MFR SERPL ELPH: 9.5 %
MONOCLON BAND OBS SERPL: 0.7 G/DL
POTASSIUM SERPL-SCNC: 4.3 MMOL/L
PROT SERPL-MCNC: 7.3 G/DL
PROT SERPL-MCNC: 7.3 G/DL
SODIUM SERPL-SCNC: 141 MMOL/L

## 2020-06-19 DIAGNOSIS — D47.2 MONOCLONAL GAMMOPATHY: ICD-10-CM

## 2020-12-23 ENCOUNTER — APPOINTMENT (OUTPATIENT)
Dept: OBGYN | Facility: CLINIC | Age: 70
End: 2020-12-23

## 2020-12-28 ENCOUNTER — LABORATORY RESULT (OUTPATIENT)
Age: 70
End: 2020-12-28

## 2020-12-28 ENCOUNTER — APPOINTMENT (OUTPATIENT)
Dept: HEMATOLOGY ONCOLOGY | Facility: CLINIC | Age: 70
End: 2020-12-28
Payer: MEDICARE

## 2020-12-28 ENCOUNTER — OUTPATIENT (OUTPATIENT)
Dept: OUTPATIENT SERVICES | Facility: HOSPITAL | Age: 70
LOS: 1 days | Discharge: HOME | End: 2020-12-28

## 2020-12-28 VITALS
TEMPERATURE: 98 F | DIASTOLIC BLOOD PRESSURE: 61 MMHG | WEIGHT: 179 LBS | SYSTOLIC BLOOD PRESSURE: 121 MMHG | RESPIRATION RATE: 18 BRPM | BODY MASS INDEX: 31.71 KG/M2 | HEART RATE: 65 BPM | HEIGHT: 63 IN

## 2020-12-28 DIAGNOSIS — Z98.890 OTHER SPECIFIED POSTPROCEDURAL STATES: Chronic | ICD-10-CM

## 2020-12-28 DIAGNOSIS — Z90.710 ACQUIRED ABSENCE OF BOTH CERVIX AND UTERUS: Chronic | ICD-10-CM

## 2020-12-28 LAB
HCT VFR BLD CALC: 37.2 %
HGB BLD-MCNC: 11.9 G/DL
MCHC RBC-ENTMCNC: 28.5 PG
MCHC RBC-ENTMCNC: 32 G/DL
MCV RBC AUTO: 89 FL
PLATELET # BLD AUTO: 210 K/UL
PMV BLD: 9 FL
RBC # BLD: 4.18 M/UL
RBC # FLD: 14.6 %
WBC # FLD AUTO: 5.29 K/UL

## 2020-12-28 PROCEDURE — 99213 OFFICE O/P EST LOW 20 MIN: CPT

## 2020-12-28 RX ORDER — PANTOPRAZOLE 40 MG/1
40 TABLET, DELAYED RELEASE ORAL
Refills: 0 | Status: ACTIVE | COMMUNITY

## 2020-12-28 RX ORDER — NYSTATIN 100000 1/G
POWDER TOPICAL
Refills: 0 | Status: ACTIVE | COMMUNITY

## 2020-12-28 RX ORDER — ESOMEPRAZOLE MAGNESIUM 10 MG/1
10 GRANULE, DELAYED RELEASE ORAL
Refills: 0 | Status: DISCONTINUED | COMMUNITY
End: 2020-12-28

## 2020-12-28 RX ORDER — IBUPROFEN AND FAMOTIDINE 800; 26.6 MG/1; MG/1
800-26.6 TABLET, COATED ORAL
Refills: 0 | Status: DISCONTINUED | COMMUNITY
End: 2020-12-28

## 2020-12-28 RX ORDER — FAMOTIDINE 20 MG/1
20 TABLET, FILM COATED ORAL
Refills: 0 | Status: ACTIVE | COMMUNITY

## 2020-12-29 LAB
ALBUMIN SERPL ELPH-MCNC: 4.7 G/DL
ALP BLD-CCNC: 76 U/L
ALT SERPL-CCNC: 22 U/L
ANION GAP SERPL CALC-SCNC: 10 MMOL/L
AST SERPL-CCNC: 20 U/L
BILIRUB SERPL-MCNC: 0.2 MG/DL
BUN SERPL-MCNC: 17 MG/DL
CALCIUM SERPL-MCNC: 9.7 MG/DL
CHLORIDE SERPL-SCNC: 105 MMOL/L
CO2 SERPL-SCNC: 24 MMOL/L
CREAT SERPL-MCNC: 0.8 MG/DL
GLUCOSE SERPL-MCNC: 88 MG/DL
POTASSIUM SERPL-SCNC: 5.1 MMOL/L
PROT SERPL-MCNC: 7.3 G/DL
SODIUM SERPL-SCNC: 139 MMOL/L

## 2020-12-29 NOTE — ASSESSMENT
[FreeTextEntry1] : IGG kappa MGUS. \par \par PLAN\par Last myeloma panel has been stable with M spike 0.7\par Blood work ordered: CBC, CMP, SPEP, IFES, FLC assay, LDH.\par UTD with  colonoscopy. Uptodate with mammo. \par Continue close follow up with Dr Dong. \par \par RTC in 6 months.\par \par Case was seen and discussed with Dr. Mccurdy who agreed with the assessment and plan.\par

## 2020-12-29 NOTE — HISTORY OF PRESENT ILLNESS
[de-identified] : This is a 67 y/o white female who is here today for a follow up visit for MGUS.  In August 2017, she was sent in for initial consultation for monoclonal gammopathy by her primary care physician.  Patient was found to have mild anemia on routine CBC on 2 occasions(11.8 g/dL on 8/8/17). As a result, her PCP performed further w/u which showed an M-spike of 0.6 g/dL (IgG Kappa band). Her ferritin was 62 ng/mL, TIBC 359, Iron 54, folate >20 and B12 488. \par Pt's history is also significant for gastritis and GERD. She also takes NSAIDs for her OA. Last EGD was 5 years ago, which showed gastritis. \par \par  [de-identified] : She is here for follow up.\par Pt recently saw her ophthalmologist and has an abnormality which she is unable to describe.\par Her vision is normal at this time.\par \par 5/13/19:\par Dxed with enteritis in Jan 2019. \par Last colonoscopy 6-7yrs ago. Scheduled to see GI today.\par Denies any complaints currently. \par Denies fever, nausea, vomiting, chest pain, SOB, abdominal pain, bowel and bladder problems.\par Uptodate with mammo. \par \par 12/16/19\par Pt is here for follow up of MGUS.\par Denies fever, nausea, vomiting, chest pain, SOB, abdominal pain, bowel and bladder problems.\par Uptodate with mammo ( 12/2/19) Reviewed. \par pt has been getting cardio w/u for bradycardia.\par C/o fatigue\par \par 6/15/20\par Pt is here for follow up visit.\par No new complaints. Denies bone pain\par Denies fever, nausea, vomiting, chest pain, SOB, abdominal pain, bowel and bladder problems.\par \par 12/28/2020\par ZULEYMA NAVA a 70 year F is here today for follow up for MGUS. \par She offers no new complaints. \par Denies fever, chills, night sweats, weight loss.\par Reports lung nodules, follows Dr Dong. Has annual Ct scan, stable, as per patient nonmalignant. \par Has mammogram appt this week for annual screening. \par Has Hermosillo esophagus, repeat in 3 months.

## 2020-12-29 NOTE — PHYSICAL EXAM
[Fully active, able to carry on all pre-disease performance without restriction] : Status 0 - Fully active, able to carry on all pre-disease performance without restriction [Normal] : affect appropriate [de-identified] : well-healed scar in upper back s/p cyst removal

## 2020-12-30 LAB
ALBUMIN MFR SERPL ELPH: 59.4 %
ALBUMIN SERPL-MCNC: 4.2 G/DL
ALBUMIN/GLOB SERPL: 1.4 RATIO
ALPHA1 GLOB MFR SERPL ELPH: 4.9 %
ALPHA1 GLOB SERPL ELPH-MCNC: 0.3 G/DL
ALPHA2 GLOB MFR SERPL ELPH: 10.7 %
ALPHA2 GLOB SERPL ELPH-MCNC: 0.8 G/DL
B-GLOBULIN MFR SERPL ELPH: 9.8 %
B-GLOBULIN SERPL ELPH-MCNC: 0.7 G/DL
DEPRECATED KAPPA LC FREE/LAMBDA SER: 1.55 RATIO
GAMMA GLOB FLD ELPH-MCNC: 1.1 G/DL
GAMMA GLOB MFR SERPL ELPH: 15.2 %
IGA SER QL IEP: 81 MG/DL
IGG SER QL IEP: 1148 MG/DL
IGM SER QL IEP: 54 MG/DL
INTERPRETATION SERPL IEP-IMP: NORMAL
KAPPA LC CSF-MCNC: 0.69 MG/DL
KAPPA LC SERPL-MCNC: 1.07 MG/DL
M PROTEIN MFR SERPL ELPH: 7.3 %
MONOCLON BAND OBS SERPL: 0.5 G/DL
PROT SERPL-MCNC: 7.1 G/DL
PROT SERPL-MCNC: 7.1 G/DL

## 2021-01-04 DIAGNOSIS — D47.2 MONOCLONAL GAMMOPATHY: ICD-10-CM

## 2021-03-04 ENCOUNTER — APPOINTMENT (OUTPATIENT)
Dept: PULMONOLOGY | Facility: CLINIC | Age: 71
End: 2021-03-04
Payer: MEDICARE

## 2021-03-04 VITALS
DIASTOLIC BLOOD PRESSURE: 74 MMHG | SYSTOLIC BLOOD PRESSURE: 126 MMHG | WEIGHT: 170 LBS | BODY MASS INDEX: 30.12 KG/M2 | RESPIRATION RATE: 14 BRPM | OXYGEN SATURATION: 98 % | HEART RATE: 76 BPM | HEIGHT: 63 IN

## 2021-03-04 PROCEDURE — 99213 OFFICE O/P EST LOW 20 MIN: CPT

## 2021-03-08 LAB
ALBUMIN SERPL ELPH-MCNC: 4.6 G/DL
ALP BLD-CCNC: 73 U/L
ALT SERPL-CCNC: 24 U/L
ANION GAP SERPL CALC-SCNC: 12 MMOL/L
AST SERPL-CCNC: 19 U/L
BASOPHILS # BLD AUTO: 0.03 K/UL
BASOPHILS NFR BLD AUTO: 0.5 %
BILIRUB SERPL-MCNC: 0.2 MG/DL
BUN SERPL-MCNC: 15 MG/DL
CALCIUM SERPL-MCNC: 10 MG/DL
CHLORIDE SERPL-SCNC: 103 MMOL/L
CO2 SERPL-SCNC: 23 MMOL/L
CREAT SERPL-MCNC: 0.7 MG/DL
DEPRECATED D DIMER PPP IA-ACNC: 170 NG/ML DDU
EOSINOPHIL # BLD AUTO: 0.09 K/UL
EOSINOPHIL NFR BLD AUTO: 1.5 %
GLUCOSE SERPL-MCNC: 88 MG/DL
HCT VFR BLD CALC: 37 %
HGB BLD-MCNC: 11.5 G/DL
IMM GRANULOCYTES NFR BLD AUTO: 0.2 %
LYMPHOCYTES # BLD AUTO: 1.16 K/UL
LYMPHOCYTES NFR BLD AUTO: 19.9 %
MAN DIFF?: NORMAL
MCHC RBC-ENTMCNC: 28 PG
MCHC RBC-ENTMCNC: 31.1 G/DL
MCV RBC AUTO: 90.2 FL
MONOCYTES # BLD AUTO: 0.51 K/UL
MONOCYTES NFR BLD AUTO: 8.7 %
NEUTROPHILS # BLD AUTO: 4.03 K/UL
NEUTROPHILS NFR BLD AUTO: 69.2 %
PLATELET # BLD AUTO: 280 K/UL
POTASSIUM SERPL-SCNC: 4.8 MMOL/L
PROT SERPL-MCNC: 7.2 G/DL
RBC # BLD: 4.1 M/UL
RBC # FLD: 14.4 %
SODIUM SERPL-SCNC: 138 MMOL/L
WBC # FLD AUTO: 5.83 K/UL

## 2021-06-14 ENCOUNTER — APPOINTMENT (OUTPATIENT)
Dept: PULMONOLOGY | Facility: CLINIC | Age: 71
End: 2021-06-14
Payer: MEDICARE

## 2021-06-14 VITALS
HEIGHT: 63 IN | WEIGHT: 175 LBS | DIASTOLIC BLOOD PRESSURE: 80 MMHG | SYSTOLIC BLOOD PRESSURE: 140 MMHG | RESPIRATION RATE: 12 BRPM | BODY MASS INDEX: 31.01 KG/M2 | OXYGEN SATURATION: 98 % | HEART RATE: 66 BPM

## 2021-06-14 PROCEDURE — 99213 OFFICE O/P EST LOW 20 MIN: CPT

## 2021-06-14 NOTE — DISCUSSION/SUMMARY
[FreeTextEntry1] : COPD ON BREO\par SOB ON EXERTION\par INTERMITTENT COUGH\par DICSUSS CT CHEST RESULT\par HEMOC REVIEWED

## 2021-06-21 ENCOUNTER — LABORATORY RESULT (OUTPATIENT)
Age: 71
End: 2021-06-21

## 2021-06-21 ENCOUNTER — OUTPATIENT (OUTPATIENT)
Dept: OUTPATIENT SERVICES | Facility: HOSPITAL | Age: 71
LOS: 1 days | Discharge: HOME | End: 2021-06-21

## 2021-06-21 ENCOUNTER — APPOINTMENT (OUTPATIENT)
Dept: HEMATOLOGY ONCOLOGY | Facility: CLINIC | Age: 71
End: 2021-06-21
Payer: MEDICARE

## 2021-06-21 VITALS
WEIGHT: 185 LBS | RESPIRATION RATE: 14 BRPM | HEIGHT: 63 IN | TEMPERATURE: 97.97 F | BODY MASS INDEX: 32.78 KG/M2 | SYSTOLIC BLOOD PRESSURE: 138 MMHG | HEART RATE: 68 BPM | DIASTOLIC BLOOD PRESSURE: 63 MMHG

## 2021-06-21 DIAGNOSIS — Z90.710 ACQUIRED ABSENCE OF BOTH CERVIX AND UTERUS: Chronic | ICD-10-CM

## 2021-06-21 DIAGNOSIS — Z98.890 OTHER SPECIFIED POSTPROCEDURAL STATES: Chronic | ICD-10-CM

## 2021-06-21 LAB
ALBUMIN SERPL ELPH-MCNC: 4.6 G/DL
ALP BLD-CCNC: 64 U/L
ALT SERPL-CCNC: 28 U/L
ANION GAP SERPL CALC-SCNC: 11 MMOL/L
AST SERPL-CCNC: 27 U/L
BILIRUB SERPL-MCNC: 0.3 MG/DL
BUN SERPL-MCNC: 19 MG/DL
CALCIUM SERPL-MCNC: 9.8 MG/DL
CHLORIDE SERPL-SCNC: 106 MMOL/L
CO2 SERPL-SCNC: 23 MMOL/L
CREAT SERPL-MCNC: 0.7 MG/DL
GLUCOSE SERPL-MCNC: 80 MG/DL
HCT VFR BLD CALC: 37.3 %
HGB BLD-MCNC: 12 G/DL
IRON SATN MFR SERPL: 27 %
IRON SERPL-MCNC: 84 UG/DL
LDH SERPL-CCNC: 267
MCHC RBC-ENTMCNC: 28.4 PG
MCHC RBC-ENTMCNC: 32.2 G/DL
MCV RBC AUTO: 88.2 FL
PLATELET # BLD AUTO: 248 K/UL
PMV BLD: 9.5 FL
POTASSIUM SERPL-SCNC: 4.9 MMOL/L
PROT SERPL-MCNC: 7.2 G/DL
RBC # BLD: 4.23 M/UL
RBC # FLD: 14.6 %
RETICS # AUTO: 1.1 %
RETICS AGGREG/RBC NFR: 47 K/UL
SODIUM SERPL-SCNC: 140 MMOL/L
TIBC SERPL-MCNC: 312 UG/DL
UIBC SERPL-MCNC: 228 UG/DL
WBC # FLD AUTO: 7.68 K/UL

## 2021-06-21 PROCEDURE — 99213 OFFICE O/P EST LOW 20 MIN: CPT

## 2021-06-21 NOTE — ASSESSMENT
[FreeTextEntry1] : IGG kappa MGUS. \par \par PLAN\par Last myeloma panel has been stable, However Hgb trending down, sent anemia w/u also\par Blood work ordered:\par UTD with  colonoscopy. Uptodate with mammo. \par \par RTC in 6 months.\par \par

## 2021-06-21 NOTE — PHYSICAL EXAM
[Fully active, able to carry on all pre-disease performance without restriction] : Status 0 - Fully active, able to carry on all pre-disease performance without restriction [Normal] : affect appropriate [de-identified] : well-healed scar in upper back s/p cyst removal

## 2021-06-21 NOTE — HISTORY OF PRESENT ILLNESS
[de-identified] : This is a 69 y/o white female who is here today for a follow up visit for MGUS.  In August 2017, she was sent in for initial consultation for monoclonal gammopathy by her primary care physician.  Patient was found to have mild anemia on routine CBC on 2 occasions(11.8 g/dL on 8/8/17). As a result, her PCP performed further w/u which showed an M-spike of 0.6 g/dL (IgG Kappa band). Her ferritin was 62 ng/mL, TIBC 359, Iron 54, folate >20 and B12 488. \par Pt's history is also significant for gastritis and GERD. She also takes NSAIDs for her OA. Last EGD was 5 years ago, which showed gastritis. \par \par  [de-identified] : She is here for follow up.\par Pt recently saw her ophthalmologist and has an abnormality which she is unable to describe.\par Her vision is normal at this time.\par \par 5/13/19:\par Dxed with enteritis in Jan 2019. \par Last colonoscopy 6-7yrs ago. Scheduled to see GI today.\par Denies any complaints currently. \par Denies fever, nausea, vomiting, chest pain, SOB, abdominal pain, bowel and bladder problems.\par Uptodate with mammo. \par \par 12/16/19\par Pt is here for follow up of MGUS.\par Denies fever, nausea, vomiting, chest pain, SOB, abdominal pain, bowel and bladder problems.\par Uptodate with mammo ( 12/2/19) Reviewed. \par pt has been getting cardio w/u for bradycardia.\par C/o fatigue\par \par 6/15/20\par Pt is here for follow up visit.\par No new complaints. Denies bone pain\par Denies fever, nausea, vomiting, chest pain, SOB, abdominal pain, bowel and bladder problems.\par \par 12/28/2020\par ZULEYMA NAVA a 70 year F is here today for follow up for MGUS. \par She offers no new complaints. \par Denies fever, chills, night sweats, weight loss.\par Reports lung nodules, follows Dr Dong. Has annual Ct scan, stable, as per patient nonmalignant. \par Has mammogram appt this week for annual screening. \par Has Hermosillo esophagus, repeat in 3 months. \par \par 6/21/21\par Pt is here for follow up.\par Has been having back pain d/t to prolapsed discs and arthritis.\par Also saw her derm and had skin biopsy on her Rt leg.\par Follows with GI for Barretts esophagus

## 2021-06-22 DIAGNOSIS — D47.2 MONOCLONAL GAMMOPATHY: ICD-10-CM

## 2021-06-22 LAB
DEPRECATED KAPPA LC FREE/LAMBDA SER: 1.72 RATIO
FERRITIN SERPL-MCNC: 56 NG/ML
FOLATE SERPL-MCNC: >20 NG/ML
KAPPA LC CSF-MCNC: 0.65 MG/DL
KAPPA LC SERPL-MCNC: 1.12 MG/DL
TSH SERPL-ACNC: 1.73 UIU/ML
VIT B12 SERPL-MCNC: 580 PG/ML

## 2021-06-25 LAB
ALBUMIN MFR SERPL ELPH: 59.6 %
ALBUMIN SERPL-MCNC: 4.3 G/DL
ALBUMIN/GLOB SERPL: 1.5 RATIO
ALPHA1 GLOB MFR SERPL ELPH: 4.4 %
ALPHA1 GLOB SERPL ELPH-MCNC: 0.3 G/DL
ALPHA2 GLOB MFR SERPL ELPH: 9.7 %
ALPHA2 GLOB SERPL ELPH-MCNC: 0.7 G/DL
B-GLOBULIN MFR SERPL ELPH: 10 %
B-GLOBULIN SERPL ELPH-MCNC: 0.7 G/DL
GAMMA GLOB FLD ELPH-MCNC: 1.2 G/DL
GAMMA GLOB MFR SERPL ELPH: 16.3 %
INTERPRETATION SERPL IEP-IMP: NORMAL
M PROTEIN MFR SERPL ELPH: 9.9 %
M PROTEIN SPEC IFE-MCNC: NORMAL
MONOCLON BAND OBS SERPL: 0.7 G/DL
PROT SERPL-MCNC: 7.2 G/DL
PROT SERPL-MCNC: 7.2 G/DL

## 2021-06-28 ENCOUNTER — APPOINTMENT (OUTPATIENT)
Dept: PULMONOLOGY | Facility: CLINIC | Age: 71
End: 2021-06-28
Payer: MEDICARE

## 2021-06-28 PROCEDURE — 94729 DIFFUSING CAPACITY: CPT

## 2021-06-28 PROCEDURE — 94727 GAS DIL/WSHOT DETER LNG VOL: CPT

## 2021-06-28 PROCEDURE — 94010 BREATHING CAPACITY TEST: CPT

## 2021-10-29 ENCOUNTER — APPOINTMENT (OUTPATIENT)
Age: 71
End: 2021-10-29
Payer: MEDICARE

## 2021-10-29 VITALS
SYSTOLIC BLOOD PRESSURE: 126 MMHG | DIASTOLIC BLOOD PRESSURE: 76 MMHG | HEIGHT: 63 IN | HEART RATE: 70 BPM | OXYGEN SATURATION: 98 % | BODY MASS INDEX: 31.71 KG/M2 | WEIGHT: 179 LBS | RESPIRATION RATE: 14 BRPM

## 2021-10-29 PROCEDURE — 99213 OFFICE O/P EST LOW 20 MIN: CPT

## 2021-10-29 NOTE — DISCUSSION/SUMMARY
[FreeTextEntry1] : ASTHMATIC ON BREO\par SOB ON EXERTION\par INTERMITTENT COUGH\par LUNG NODULES\par CARDIO F/UP

## 2021-11-24 ENCOUNTER — RX RENEWAL (OUTPATIENT)
Age: 71
End: 2021-11-24

## 2022-01-17 ENCOUNTER — OUTPATIENT (OUTPATIENT)
Dept: OUTPATIENT SERVICES | Facility: HOSPITAL | Age: 72
LOS: 1 days | Discharge: HOME | End: 2022-01-17

## 2022-01-17 ENCOUNTER — RESULT REVIEW (OUTPATIENT)
Age: 72
End: 2022-01-17

## 2022-01-17 ENCOUNTER — LABORATORY RESULT (OUTPATIENT)
Age: 72
End: 2022-01-17

## 2022-01-17 ENCOUNTER — OUTPATIENT (OUTPATIENT)
Dept: OUTPATIENT SERVICES | Facility: HOSPITAL | Age: 72
LOS: 1 days | Discharge: HOME | End: 2022-01-17
Payer: MEDICARE

## 2022-01-17 ENCOUNTER — APPOINTMENT (OUTPATIENT)
Dept: HEMATOLOGY ONCOLOGY | Facility: CLINIC | Age: 72
End: 2022-01-17
Payer: MEDICARE

## 2022-01-17 VITALS
BODY MASS INDEX: 31.89 KG/M2 | TEMPERATURE: 96.6 F | DIASTOLIC BLOOD PRESSURE: 77 MMHG | WEIGHT: 180 LBS | HEART RATE: 77 BPM | SYSTOLIC BLOOD PRESSURE: 149 MMHG | HEIGHT: 63 IN

## 2022-01-17 DIAGNOSIS — Z98.890 OTHER SPECIFIED POSTPROCEDURAL STATES: Chronic | ICD-10-CM

## 2022-01-17 DIAGNOSIS — Z90.710 ACQUIRED ABSENCE OF BOTH CERVIX AND UTERUS: Chronic | ICD-10-CM

## 2022-01-17 DIAGNOSIS — F17.211 NICOTINE DEPENDENCE, CIGARETTES, IN REMISSION: ICD-10-CM

## 2022-01-17 DIAGNOSIS — R91.1 SOLITARY PULMONARY NODULE: ICD-10-CM

## 2022-01-17 LAB
HCT VFR BLD CALC: 37.3 %
HGB BLD-MCNC: 12.2 G/DL
MCHC RBC-ENTMCNC: 28.8 PG
MCHC RBC-ENTMCNC: 32.7 G/DL
MCV RBC AUTO: 88 FL
PLATELET # BLD AUTO: 267 K/UL
PMV BLD: 9 FL
RBC # BLD: 4.24 M/UL
RBC # FLD: 14.5 %
WBC # FLD AUTO: 7.58 K/UL

## 2022-01-17 PROCEDURE — 99213 OFFICE O/P EST LOW 20 MIN: CPT

## 2022-01-17 PROCEDURE — 71271 CT THORAX LUNG CANCER SCR C-: CPT | Mod: 26

## 2022-01-17 NOTE — ASSESSMENT
[FreeTextEntry1] : IGG kappa MGUS. \par \par PLAN\par Last myeloma panel has been stable,\par Blood work ordered:\par UTD with  colonoscopy. Up to date with mammo. \par \par RTC in 6 months.\par \par

## 2022-01-17 NOTE — PHYSICAL EXAM
[Fully active, able to carry on all pre-disease performance without restriction] : Status 0 - Fully active, able to carry on all pre-disease performance without restriction [Normal] : affect appropriate [de-identified] : well-healed scar in upper back s/p cyst removal

## 2022-01-17 NOTE — HISTORY OF PRESENT ILLNESS
[de-identified] : This is a 69 y/o white female who is here today for a follow up visit for MGUS.  In August 2017, she was sent in for initial consultation for monoclonal gammopathy by her primary care physician.  Patient was found to have mild anemia on routine CBC on 2 occasions(11.8 g/dL on 8/8/17). As a result, her PCP performed further w/u which showed an M-spike of 0.6 g/dL (IgG Kappa band). Her ferritin was 62 ng/mL, TIBC 359, Iron 54, folate >20 and B12 488. \par Pt's history is also significant for gastritis and GERD. She also takes NSAIDs for her OA. Last EGD was 5 years ago, which showed gastritis. \par \par  [de-identified] : She is here for follow up.\par Pt recently saw her ophthalmologist and has an abnormality which she is unable to describe.\par Her vision is normal at this time.\par \par 5/13/19:\par Dxed with enteritis in Jan 2019. \par Last colonoscopy 6-7yrs ago. Scheduled to see GI today.\par Denies any complaints currently. \par Denies fever, nausea, vomiting, chest pain, SOB, abdominal pain, bowel and bladder problems.\par Uptodate with mammo. \par \par 12/16/19\par Pt is here for follow up of MGUS.\par Denies fever, nausea, vomiting, chest pain, SOB, abdominal pain, bowel and bladder problems.\par Uptodate with mammo ( 12/2/19) Reviewed. \par pt has been getting cardio w/u for bradycardia.\par C/o fatigue\par \par 6/15/20\par Pt is here for follow up visit.\par No new complaints. Denies bone pain\par Denies fever, nausea, vomiting, chest pain, SOB, abdominal pain, bowel and bladder problems.\par \par 12/28/2020\par ZULEYMA NAVA a 70 year F is here today for follow up for MGUS. \par She offers no new complaints. \par Denies fever, chills, night sweats, weight loss.\par Reports lung nodules, follows Dr Dong. Has annual Ct scan, stable, as per patient nonmalignant. \par Has mammogram appt this week for annual screening. \par Has Hermosillo esophagus, repeat in 3 months. \par \par 6/21/21\par Pt is here for follow up.\par Has been having back pain d/t to prolapsed discs and arthritis.\par Also saw her derm and had skin biopsy on her Rt leg.\par Follows with GI for Barretts esophagus\par \par 1/17/22\par Pt is here for follow up.\par No bone pain. Has CHOPRA, being investigated by pulm and cardio. Going for CT chest has h/o lung nodules. H/O heavy smoking.\par UTD with GI and GYN

## 2022-01-18 DIAGNOSIS — D47.2 MONOCLONAL GAMMOPATHY: ICD-10-CM

## 2022-01-18 LAB
ALBUMIN SERPL ELPH-MCNC: 4.6 G/DL
ALP BLD-CCNC: 79 U/L
ALT SERPL-CCNC: 25 U/L
ANION GAP SERPL CALC-SCNC: 14 MMOL/L
AST SERPL-CCNC: 20 U/L
BILIRUB SERPL-MCNC: <0.2 MG/DL
BUN SERPL-MCNC: 15 MG/DL
CALCIUM SERPL-MCNC: 10.2 MG/DL
CHLORIDE SERPL-SCNC: 104 MMOL/L
CO2 SERPL-SCNC: 21 MMOL/L
CREAT SERPL-MCNC: 0.7 MG/DL
DEPRECATED KAPPA LC FREE/LAMBDA SER: 1.68 RATIO
GLUCOSE SERPL-MCNC: 91 MG/DL
KAPPA LC CSF-MCNC: 0.73 MG/DL
KAPPA LC SERPL-MCNC: 1.23 MG/DL
POTASSIUM SERPL-SCNC: 5 MMOL/L
PROT SERPL-MCNC: 7.4 G/DL
SODIUM SERPL-SCNC: 139 MMOL/L

## 2022-01-19 DIAGNOSIS — Z02.9 ENCOUNTER FOR ADMINISTRATIVE EXAMINATIONS, UNSPECIFIED: ICD-10-CM

## 2022-01-19 DIAGNOSIS — R91.1 SOLITARY PULMONARY NODULE: ICD-10-CM

## 2022-01-24 LAB
ALBUMIN MFR SERPL ELPH: 58.1 %
ALBUMIN SERPL-MCNC: 4.2 G/DL
ALBUMIN/GLOB SERPL: 1.4 RATIO
ALBUPE: 28.1 %
ALPHA1 GLOB MFR SERPL ELPH: 5 %
ALPHA1 GLOB SERPL ELPH-MCNC: 0.4 G/DL
ALPHA1UPE: 38.1 %
ALPHA2 GLOB MFR SERPL ELPH: 10.9 %
ALPHA2 GLOB SERPL ELPH-MCNC: 0.8 G/DL
ALPHA2UPE: 20.3 %
B-GLOBULIN MFR SERPL ELPH: 9.9 %
B-GLOBULIN SERPL ELPH-MCNC: 0.7 G/DL
BETAUPE: 10.5 %
CREAT 24H UR-MCNC: NORMAL G/24 H
CREATININE UR (MAYO): 152 MG/DL
GAMMA GLOB FLD ELPH-MCNC: 1.2 G/DL
GAMMA GLOB MFR SERPL ELPH: 16.1 %
GAMMAUPE: 3 %
IGA 24H UR QL IFE: NORMAL
INTERPRETATION SERPL IEP-IMP: NORMAL
KAPPA LC 24H UR QL: NORMAL
M PROTEIN MFR SERPL ELPH: 9.9 %
MONOCLON BAND OBS SERPL: 0.7 G/DL
PROT PATTERN 24H UR ELPH-IMP: NORMAL
PROT SERPL-MCNC: 7.2 G/DL
PROT SERPL-MCNC: 7.2 G/DL
PROT UR-MCNC: 10 MG/DL
PROT UR-MCNC: 10 MG/DL
SPECIMEN VOL 24H UR: NORMAL ML

## 2022-02-04 ENCOUNTER — APPOINTMENT (OUTPATIENT)
Age: 72
End: 2022-02-04
Payer: MEDICARE

## 2022-02-04 VITALS
HEIGHT: 63 IN | SYSTOLIC BLOOD PRESSURE: 114 MMHG | OXYGEN SATURATION: 99 % | WEIGHT: 184 LBS | HEART RATE: 74 BPM | BODY MASS INDEX: 32.6 KG/M2 | RESPIRATION RATE: 14 BRPM | DIASTOLIC BLOOD PRESSURE: 68 MMHG

## 2022-02-04 PROCEDURE — 99213 OFFICE O/P EST LOW 20 MIN: CPT

## 2022-02-04 NOTE — HISTORY OF PRESENT ILLNESS
[TextBox_4] : STILL CO SOB ON EXERTION\par PFT NL\par CHEST CT 2 SMALL NODULES\par FOLLOWED BY CARDIO

## 2022-02-04 NOTE — DISCUSSION/SUMMARY
[FreeTextEntry1] : ASTHMATIC BRONCHITIS ON BREO\par SOB ON EXERTION\par INTERMITTENT COUGH\par LUNG NODULES\par CARDIO F/UP\par DISCUSS CHEST CT

## 2022-02-05 ENCOUNTER — APPOINTMENT (OUTPATIENT)
Age: 72
End: 2022-02-05

## 2022-09-12 ENCOUNTER — OUTPATIENT (OUTPATIENT)
Dept: OUTPATIENT SERVICES | Facility: HOSPITAL | Age: 72
LOS: 1 days | Discharge: HOME | End: 2022-09-12

## 2022-09-12 ENCOUNTER — APPOINTMENT (OUTPATIENT)
Dept: HEMATOLOGY ONCOLOGY | Facility: CLINIC | Age: 72
End: 2022-09-12

## 2022-09-12 VITALS
SYSTOLIC BLOOD PRESSURE: 171 MMHG | WEIGHT: 185 LBS | HEIGHT: 63 IN | TEMPERATURE: 98.3 F | HEART RATE: 65 BPM | BODY MASS INDEX: 32.78 KG/M2 | DIASTOLIC BLOOD PRESSURE: 72 MMHG

## 2022-09-12 DIAGNOSIS — Z90.710 ACQUIRED ABSENCE OF BOTH CERVIX AND UTERUS: Chronic | ICD-10-CM

## 2022-09-12 DIAGNOSIS — Z98.890 OTHER SPECIFIED POSTPROCEDURAL STATES: Chronic | ICD-10-CM

## 2022-09-12 LAB
BASOPHILS # BLD AUTO: 0.04 K/UL
BASOPHILS NFR BLD AUTO: 0.5 %
EOSINOPHIL # BLD AUTO: 0.08 K/UL
EOSINOPHIL NFR BLD AUTO: 1 %
HCT VFR BLD CALC: 37.2 %
HGB BLD-MCNC: 12.1 G/DL
IMM GRANULOCYTES NFR BLD AUTO: 0.6 %
LYMPHOCYTES # BLD AUTO: 1.33 K/UL
LYMPHOCYTES NFR BLD AUTO: 15.8 %
MAN DIFF?: NORMAL
MCHC RBC-ENTMCNC: 28.6 PG
MCHC RBC-ENTMCNC: 32.5 G/DL
MCV RBC AUTO: 87.9 FL
MONOCYTES # BLD AUTO: 0.74 K/UL
MONOCYTES NFR BLD AUTO: 8.8 %
NEUTROPHILS # BLD AUTO: 6.17 K/UL
NEUTROPHILS NFR BLD AUTO: 73.3 %
PLATELET # BLD AUTO: 280 K/UL
RBC # BLD: 4.23 M/UL
RBC # FLD: 14.8 %
WBC # FLD AUTO: 8.41 K/UL

## 2022-09-12 PROCEDURE — 99213 OFFICE O/P EST LOW 20 MIN: CPT

## 2022-09-13 LAB
ALBUMIN SERPL ELPH-MCNC: 4.5 G/DL
ALP BLD-CCNC: 59 U/L
ALT SERPL-CCNC: 19 U/L
ANION GAP SERPL CALC-SCNC: 10 MMOL/L
AST SERPL-CCNC: 17 U/L
BILIRUB SERPL-MCNC: <0.2 MG/DL
BUN SERPL-MCNC: 16 MG/DL
CALCIUM SERPL-MCNC: 9.9 MG/DL
CHLORIDE SERPL-SCNC: 106 MMOL/L
CO2 SERPL-SCNC: 25 MMOL/L
CREAT SERPL-MCNC: 0.7 MG/DL
DEPRECATED KAPPA LC FREE/LAMBDA SER: 1.49 RATIO
EGFR: 91 ML/MIN/1.73M2
GLUCOSE SERPL-MCNC: 83 MG/DL
KAPPA LC CSF-MCNC: 0.73 MG/DL
KAPPA LC SERPL-MCNC: 1.09 MG/DL
POTASSIUM SERPL-SCNC: 5 MMOL/L
PROT SERPL-MCNC: 7.1 G/DL
SODIUM SERPL-SCNC: 141 MMOL/L

## 2022-09-19 LAB
ALBUMIN MFR SERPL ELPH: 60.9 %
ALBUMIN SERPL-MCNC: 4.3 G/DL
ALBUMIN/GLOB SERPL: 1.5 RATIO
ALPHA1 GLOB MFR SERPL ELPH: 4.4 %
ALPHA1 GLOB SERPL ELPH-MCNC: 0.3 G/DL
ALPHA2 GLOB MFR SERPL ELPH: 10.1 %
ALPHA2 GLOB SERPL ELPH-MCNC: 0.7 G/DL
B-GLOBULIN MFR SERPL ELPH: 10 %
B-GLOBULIN SERPL ELPH-MCNC: 0.7 G/DL
GAMMA GLOB FLD ELPH-MCNC: 1 G/DL
GAMMA GLOB MFR SERPL ELPH: 14.6 %
INTERPRETATION SERPL IEP-IMP: NORMAL
M PROTEIN MFR SERPL ELPH: 9 %
M PROTEIN SPEC IFE-MCNC: NORMAL
MONOCLON BAND OBS SERPL: 0.6 G/DL
PROT SERPL-MCNC: 7.1 G/DL
PROT SERPL-MCNC: 7.1 G/DL

## 2022-09-21 DIAGNOSIS — D47.2 MONOCLONAL GAMMOPATHY: ICD-10-CM

## 2022-09-21 NOTE — HISTORY OF PRESENT ILLNESS
[de-identified] : This is a 67 y/o white female who is here today for a follow up visit for MGUS.  In August 2017, she was sent in for initial consultation for monoclonal gammopathy by her primary care physician.  Patient was found to have mild anemia on routine CBC on 2 occasions(11.8 g/dL on 8/8/17). As a result, her PCP performed further w/u which showed an M-spike of 0.6 g/dL (IgG Kappa band). Her ferritin was 62 ng/mL, TIBC 359, Iron 54, folate >20 and B12 488. \par Pt's history is also significant for gastritis and GERD. She also takes NSAIDs for her OA. Last EGD was 5 years ago, which showed gastritis. \par \par  [de-identified] : She is here for follow up.\par Pt recently saw her ophthalmologist and has an abnormality which she is unable to describe.\par Her vision is normal at this time.\par \par 5/13/19:\par Dxed with enteritis in Jan 2019. \par Last colonoscopy 6-7yrs ago. Scheduled to see GI today.\par Denies any complaints currently. \par Denies fever, nausea, vomiting, chest pain, SOB, abdominal pain, bowel and bladder problems.\par Uptodate with mammo. \par \par 12/16/19\par Pt is here for follow up of MGUS.\par Denies fever, nausea, vomiting, chest pain, SOB, abdominal pain, bowel and bladder problems.\par Uptodate with mammo ( 12/2/19) Reviewed. \par pt has been getting cardio w/u for bradycardia.\par C/o fatigue\par \par 6/15/20\par Pt is here for follow up visit.\par No new complaints. Denies bone pain\par Denies fever, nausea, vomiting, chest pain, SOB, abdominal pain, bowel and bladder problems.\par \par 12/28/2020\par ZULEYMA NAVA a 70 year F is here today for follow up for MGUS. \par She offers no new complaints. \par Denies fever, chills, night sweats, weight loss.\par Reports lung nodules, follows Dr Dong. Has annual Ct scan, stable, as per patient nonmalignant. \par Has mammogram appt this week for annual screening. \par Has Hermosillo esophagus, repeat in 3 months. \par \par 6/21/21\par Pt is here for follow up.\par Has been having back pain d/t to prolapsed discs and arthritis.\par Also saw her derm and had skin biopsy on her Rt leg.\par Follows with GI for Barretts esophagus\par \par 1/17/22\par Pt is here for follow up.\par No bone pain. Has CHOPRA, being investigated by pulm and cardio. Going for CT chest has h/o lung nodules. H/O heavy smoking.\par UTD with GI and GYN\par \par 9/12/22\par Pt is here for follow up.\par Feels well. No bone pain\par Follows with Pulm

## 2022-09-21 NOTE — PHYSICAL EXAM
[Fully active, able to carry on all pre-disease performance without restriction] : Status 0 - Fully active, able to carry on all pre-disease performance without restriction [Normal] : affect appropriate [de-identified] : well-healed scar in upper back s/p cyst removal

## 2022-10-14 ENCOUNTER — RX RENEWAL (OUTPATIENT)
Age: 72
End: 2022-10-14

## 2022-10-18 ENCOUNTER — APPOINTMENT (OUTPATIENT)
Age: 72
End: 2022-10-18

## 2022-10-21 ENCOUNTER — RX RENEWAL (OUTPATIENT)
Age: 72
End: 2022-10-21

## 2022-11-08 ENCOUNTER — APPOINTMENT (OUTPATIENT)
Age: 72
End: 2022-11-08

## 2022-11-08 VITALS
OXYGEN SATURATION: 98 % | BODY MASS INDEX: 28.93 KG/M2 | SYSTOLIC BLOOD PRESSURE: 140 MMHG | DIASTOLIC BLOOD PRESSURE: 82 MMHG | HEIGHT: 66 IN | WEIGHT: 180 LBS | HEART RATE: 62 BPM

## 2022-11-08 PROCEDURE — 99213 OFFICE O/P EST LOW 20 MIN: CPT

## 2022-11-08 NOTE — DISCUSSION/SUMMARY
[FreeTextEntry1] : ASTHMATIC ON BREO\par SOB ON EXERTION/ SPOKE TO PATIENT TO RECONSIDER CATH\par INTERMITTENT COUGH\par LUNG NODULES\par CARDIO F/UP

## 2022-11-22 ENCOUNTER — OUTPATIENT (OUTPATIENT)
Dept: OUTPATIENT SERVICES | Facility: HOSPITAL | Age: 72
LOS: 1 days | Discharge: HOME | End: 2022-11-22

## 2022-11-22 VITALS — SYSTOLIC BLOOD PRESSURE: 185 MMHG | DIASTOLIC BLOOD PRESSURE: 85 MMHG | OXYGEN SATURATION: 97 % | HEART RATE: 75 BPM

## 2022-11-22 DIAGNOSIS — Z90.710 ACQUIRED ABSENCE OF BOTH CERVIX AND UTERUS: Chronic | ICD-10-CM

## 2022-11-22 DIAGNOSIS — Z98.890 OTHER SPECIFIED POSTPROCEDURAL STATES: Chronic | ICD-10-CM

## 2022-11-22 LAB
ANION GAP SERPL CALC-SCNC: 11 MMOL/L — SIGNIFICANT CHANGE UP (ref 7–14)
BUN SERPL-MCNC: 16 MG/DL — SIGNIFICANT CHANGE UP (ref 10–20)
CALCIUM SERPL-MCNC: 9.9 MG/DL — SIGNIFICANT CHANGE UP (ref 8.4–10.5)
CHLORIDE SERPL-SCNC: 109 MMOL/L — SIGNIFICANT CHANGE UP (ref 98–110)
CO2 SERPL-SCNC: 24 MMOL/L — SIGNIFICANT CHANGE UP (ref 17–32)
CREAT SERPL-MCNC: 0.7 MG/DL — SIGNIFICANT CHANGE UP (ref 0.7–1.5)
EGFR: 91 ML/MIN/1.73M2 — SIGNIFICANT CHANGE UP
GLUCOSE SERPL-MCNC: 131 MG/DL — HIGH (ref 70–99)
HCT VFR BLD CALC: 39.6 % — SIGNIFICANT CHANGE UP (ref 37–47)
HGB BLD-MCNC: 12.8 G/DL — SIGNIFICANT CHANGE UP (ref 12–16)
MCHC RBC-ENTMCNC: 28.8 PG — SIGNIFICANT CHANGE UP (ref 27–31)
MCHC RBC-ENTMCNC: 32.3 G/DL — SIGNIFICANT CHANGE UP (ref 32–37)
MCV RBC AUTO: 89.2 FL — SIGNIFICANT CHANGE UP (ref 81–99)
NRBC # BLD: 0 /100 WBCS — SIGNIFICANT CHANGE UP (ref 0–0)
PLATELET # BLD AUTO: 290 K/UL — SIGNIFICANT CHANGE UP (ref 130–400)
POTASSIUM SERPL-MCNC: 4.3 MMOL/L — SIGNIFICANT CHANGE UP (ref 3.5–5)
POTASSIUM SERPL-SCNC: 4.3 MMOL/L — SIGNIFICANT CHANGE UP (ref 3.5–5)
RBC # BLD: 4.44 M/UL — SIGNIFICANT CHANGE UP (ref 4.2–5.4)
RBC # FLD: 13.9 % — SIGNIFICANT CHANGE UP (ref 11.5–14.5)
SODIUM SERPL-SCNC: 144 MMOL/L — SIGNIFICANT CHANGE UP (ref 135–146)
WBC # BLD: 10.82 K/UL — HIGH (ref 4.8–10.8)
WBC # FLD AUTO: 10.82 K/UL — HIGH (ref 4.8–10.8)

## 2022-11-22 RX ORDER — GABAPENTIN 400 MG/1
1 CAPSULE ORAL
Qty: 0 | Refills: 0 | DISCHARGE

## 2022-11-22 RX ORDER — BENAZEPRIL HYDROCHLORIDE 40 MG/1
1 TABLET ORAL
Qty: 0 | Refills: 0 | DISCHARGE

## 2022-11-22 RX ORDER — ESCITALOPRAM OXALATE 10 MG/1
2 TABLET, FILM COATED ORAL
Qty: 0 | Refills: 0 | DISCHARGE

## 2022-11-22 RX ORDER — ROSUVASTATIN CALCIUM 5 MG/1
1 TABLET ORAL
Qty: 0 | Refills: 0 | DISCHARGE

## 2022-11-22 RX ORDER — OMEPRAZOLE 10 MG/1
1 CAPSULE, DELAYED RELEASE ORAL
Qty: 0 | Refills: 0 | DISCHARGE

## 2022-11-22 NOTE — H&P CARDIOLOGY - HISTORY OF PRESENT ILLNESS
71 y/o female presents today for [facility] due to dyspnea/ CP on exertion and + coronary calcifications on CT.     PMHx: lung nodule, asthma, COPD, GERD, macular degeneration, OA, basal cell carcinoma, h/o LE DVT, retinal detachment     PSHx: BL hip replacement, ovarian cyst ex, appendectomy, GIL, eye sx      Pre cath note:    indication:  [ ] STEMI                [ ] NSTEMI                 [ ] Acute coronary syndrome                     [ ]Unstable Angina   [ ] high risk  [ ] intermediate risk  [ ] low risk                     [ ] Stable Angina     non-invasive testing:   CT                     Date:                     result: [ ] high risk  [ ] intermediate risk  [ ] low risk    Anti- Anginal medications:                    [ ] not used                       [ ] used                   [ ] not used but strong indication not to use    Ejection Fraction                   [ ] <29            [ ] 30-39%   [ ] 40-49%     [ x]>50%    CHF                   [ ] active (within last 14 days on meds   [ ] Chronic (on meds but no exacerbation)    COPD                   [ ] mild (on chronic bronchodilators)  [ ] moderate (on chronic steroid therapy)      [ ] severe (indication for home O2 or PACO2 >50)    Other risk factors:                       [ ] Previous MI                     [ ] CVA/ stroke                    [ ] carotid stent/ CEA                    [ ] PVD/PAD- (arterial aneurysm, non-palpable pulses, tortuous vessel with inability to insert catheter, infra-renal dissection, renal or subclavian artery stenosis)                    [ ] diabetic                    [ ] previous CABG                    [ ] Renal Failure                                  Right Corwin Test: positive    Adjusted Cath Bleeding Risk: 1.9%

## 2022-11-22 NOTE — CHART NOTE - NSCHARTNOTEFT_GEN_A_CORE
PRE-OP DIAGNOSIS:    unexplained worsening shortness of breath    PROCEDURE:     [x] Coronary Angiogram     [x] LHC     [] LVG     [] RHC     [] Intervention (see below)         PHYSICIAN:  Dr. eLe    ASSISTANT:  Dr. Welsh       PROCEDURE DESCRIPTION:     Consent:      [x] Patient     [] Family Member     []  Used        Anesthesia:     [] General     [x] Sedation     [x] Local        Access & Closure:     [x] 6 Fr right Radial Artery (D-stat)    [] Fr Femoral Artery     [] Fr Femoral Vein     [] Fr Brachial Vein       IV Contrast: 35 mL        Intervention: none    Implants: none       FINDINGS:     Coronary Dominance: right dominant      LM: normal    LAD: minor luminal irregularities    CX: minor luminal irregularities    Ramus: minor luminal irregularities    RCA: minor luminal irregularities          LVEDP: 13 mmHg           ESTIMATED BLOOD LOSS: < 10 mL        CONDITION:     [x] Good     [] Fair     [] Critical        SPECIMEN REMOVED: N/A       POST-OP DIAGNOSIS:      [] Normal Coronary Angiogram     [x] Mild Coronary Artery Disease (< 50% stenosis)     [] __ Vessel Coronary Artery Disease        PLAN OF CARE:     [x] D/C Home Today     [x] Medications:   - continue with ACEi, crestor    [x] IV Fluids: NS@100cc/hr x 3 hours    F/U with Cardiologist as outpatient

## 2022-11-22 NOTE — ASU PATIENT PROFILE, ADULT - FALL HARM RISK - UNIVERSAL INTERVENTIONS
Bed in lowest position, wheels locked, appropriate side rails in place/Call bell, personal items and telephone in reach/Instruct patient to call for assistance before getting out of bed or chair/Non-slip footwear when patient is out of bed/[place] to call system/Physically safe environment - no spills, clutter or unnecessary equipment/Purposeful Proactive Rounding/Room/bathroom lighting operational, light cord in reach

## 2022-12-01 DIAGNOSIS — K21.9 GASTRO-ESOPHAGEAL REFLUX DISEASE WITHOUT ESOPHAGITIS: ICD-10-CM

## 2022-12-01 DIAGNOSIS — M19.90 UNSPECIFIED OSTEOARTHRITIS, UNSPECIFIED SITE: ICD-10-CM

## 2022-12-01 DIAGNOSIS — R06.02 SHORTNESS OF BREATH: ICD-10-CM

## 2022-12-01 DIAGNOSIS — Z91.041 RADIOGRAPHIC DYE ALLERGY STATUS: ICD-10-CM

## 2022-12-01 DIAGNOSIS — K44.9 DIAPHRAGMATIC HERNIA WITHOUT OBSTRUCTION OR GANGRENE: ICD-10-CM

## 2022-12-01 DIAGNOSIS — Z86.718 PERSONAL HISTORY OF OTHER VENOUS THROMBOSIS AND EMBOLISM: ICD-10-CM

## 2022-12-01 DIAGNOSIS — E78.5 HYPERLIPIDEMIA, UNSPECIFIED: ICD-10-CM

## 2022-12-01 DIAGNOSIS — J45.909 UNSPECIFIED ASTHMA, UNCOMPLICATED: ICD-10-CM

## 2022-12-01 DIAGNOSIS — Z88.6 ALLERGY STATUS TO ANALGESIC AGENT: ICD-10-CM

## 2022-12-01 DIAGNOSIS — Z87.891 PERSONAL HISTORY OF NICOTINE DEPENDENCE: ICD-10-CM

## 2022-12-01 DIAGNOSIS — I25.118 ATHEROSCLEROTIC HEART DISEASE OF NATIVE CORONARY ARTERY WITH OTHER FORMS OF ANGINA PECTORIS: ICD-10-CM

## 2022-12-01 DIAGNOSIS — I10 ESSENTIAL (PRIMARY) HYPERTENSION: ICD-10-CM

## 2022-12-07 PROBLEM — Z86.79 PERSONAL HISTORY OF OTHER DISEASES OF THE CIRCULATORY SYSTEM: Chronic | Status: ACTIVE | Noted: 2022-11-22

## 2023-01-11 ENCOUNTER — RESULT REVIEW (OUTPATIENT)
Age: 73
End: 2023-01-11

## 2023-01-11 ENCOUNTER — OUTPATIENT (OUTPATIENT)
Dept: OUTPATIENT SERVICES | Facility: HOSPITAL | Age: 73
LOS: 1 days | Discharge: HOME | End: 2023-01-11
Payer: MEDICARE

## 2023-01-11 DIAGNOSIS — Z90.710 ACQUIRED ABSENCE OF BOTH CERVIX AND UTERUS: Chronic | ICD-10-CM

## 2023-01-11 DIAGNOSIS — R91.1 SOLITARY PULMONARY NODULE: ICD-10-CM

## 2023-01-11 DIAGNOSIS — Z98.890 OTHER SPECIFIED POSTPROCEDURAL STATES: Chronic | ICD-10-CM

## 2023-01-11 PROCEDURE — 71250 CT THORAX DX C-: CPT | Mod: 26,MH

## 2023-01-17 ENCOUNTER — APPOINTMENT (OUTPATIENT)
Age: 73
End: 2023-01-17
Payer: MEDICARE

## 2023-01-17 PROCEDURE — 99442: CPT | Mod: 95

## 2023-01-17 NOTE — HISTORY OF PRESENT ILLNESS
[TextBox_4] : Status post COVID around Thanksgiving was complaining of cough shortness of breath spoke with her PMD was given prednisone steroid she feels better also she underwent such as chest CT which show new 9 mm left lung nodule

## 2023-01-17 NOTE — DISCUSSION/SUMMARY
[FreeTextEntry1] : ASTHMATIC BRONCHITIS SP COVID\par SOB ON EXERTION/ \par INTERMITTENT COUGH\par LUNG NODULE NEW CHEST CT IN 12 WEEKS\par CARDIO F/UP

## 2023-01-30 RX ORDER — ALBUTEROL SULFATE 90 UG/1
108 (90 BASE) AEROSOL, METERED RESPIRATORY (INHALATION)
Qty: 1 | Refills: 6 | Status: COMPLETED | COMMUNITY
Start: 2022-02-04 | End: 2023-01-30

## 2023-01-30 RX ORDER — FLUTICASONE FUROATE AND VILANTEROL TRIFENATATE 100; 25 UG/1; UG/1
100-25 POWDER RESPIRATORY (INHALATION)
Qty: 60 | Refills: 0 | Status: COMPLETED | COMMUNITY
Start: 2022-10-14 | End: 2023-01-30

## 2023-02-01 ENCOUNTER — LABORATORY RESULT (OUTPATIENT)
Age: 73
End: 2023-02-01

## 2023-02-01 ENCOUNTER — APPOINTMENT (OUTPATIENT)
Age: 73
End: 2023-02-01
Payer: MEDICARE

## 2023-02-01 ENCOUNTER — APPOINTMENT (OUTPATIENT)
Age: 73
End: 2023-02-01

## 2023-02-01 VITALS
HEIGHT: 66 IN | DIASTOLIC BLOOD PRESSURE: 78 MMHG | OXYGEN SATURATION: 96 % | HEART RATE: 81 BPM | SYSTOLIC BLOOD PRESSURE: 118 MMHG | BODY MASS INDEX: 29.73 KG/M2 | RESPIRATION RATE: 14 BRPM | WEIGHT: 185 LBS

## 2023-02-01 PROCEDURE — 99214 OFFICE O/P EST MOD 30 MIN: CPT | Mod: CS

## 2023-02-01 RX ORDER — ALBUTEROL SULFATE 90 UG/1
108 (90 BASE) INHALANT RESPIRATORY (INHALATION)
Qty: 30 | Refills: 0 | Status: COMPLETED | COMMUNITY
Start: 2021-06-14 | End: 2023-02-01

## 2023-02-01 RX ORDER — PREDNISONE 20 MG/1
20 TABLET ORAL
Qty: 15 | Refills: 0 | Status: ACTIVE | COMMUNITY
Start: 2023-02-01 | End: 1900-01-01

## 2023-02-01 RX ORDER — AZITHROMYCIN 250 MG/1
250 TABLET, FILM COATED ORAL DAILY
Qty: 1 | Refills: 0 | Status: ACTIVE | COMMUNITY
Start: 2023-02-01 | End: 1900-01-01

## 2023-02-01 NOTE — DISCUSSION/SUMMARY
[FreeTextEntry1] : ASTHMATIC BRONCHITIS ON BREO NOW EXACERBAITON\par LUNG NODULE\par SOB ON EXERTION/ SPOKE TO PATIENT TO RECONSIDER CATH\par INTERMITTENT COUGH\par LUNG NODULES\par CARDIO F/UP

## 2023-02-01 NOTE — HISTORY OF PRESENT ILLNESS
[TextBox_4] : SOB / CP? ON EXERTION SAW CARDIO \par ON BREO\par LUNG NODULES\par SP COVID, COUGH, CHEST BURNING

## 2023-02-02 RX ORDER — BENZONATATE 200 MG/1
200 CAPSULE ORAL
Qty: 30 | Refills: 1 | Status: ACTIVE | COMMUNITY
Start: 2023-02-02 | End: 1900-01-01

## 2023-03-22 ENCOUNTER — RX RENEWAL (OUTPATIENT)
Age: 73
End: 2023-03-22

## 2023-03-27 ENCOUNTER — LABORATORY RESULT (OUTPATIENT)
Age: 73
End: 2023-03-27

## 2023-03-27 ENCOUNTER — APPOINTMENT (OUTPATIENT)
Dept: HEMATOLOGY ONCOLOGY | Facility: CLINIC | Age: 73
End: 2023-03-27
Payer: MEDICARE

## 2023-03-27 ENCOUNTER — APPOINTMENT (OUTPATIENT)
Dept: HEMATOLOGY ONCOLOGY | Facility: CLINIC | Age: 73
End: 2023-03-27

## 2023-03-27 ENCOUNTER — OUTPATIENT (OUTPATIENT)
Dept: OUTPATIENT SERVICES | Facility: HOSPITAL | Age: 73
LOS: 1 days | End: 2023-03-27
Payer: MEDICARE

## 2023-03-27 VITALS
TEMPERATURE: 97.3 F | RESPIRATION RATE: 14 BRPM | HEART RATE: 56 BPM | BODY MASS INDEX: 30.99 KG/M2 | SYSTOLIC BLOOD PRESSURE: 144 MMHG | WEIGHT: 192 LBS | DIASTOLIC BLOOD PRESSURE: 65 MMHG

## 2023-03-27 DIAGNOSIS — R91.1 SOLITARY PULMONARY NODULE: ICD-10-CM

## 2023-03-27 DIAGNOSIS — Z98.890 OTHER SPECIFIED POSTPROCEDURAL STATES: Chronic | ICD-10-CM

## 2023-03-27 DIAGNOSIS — Z90.710 ACQUIRED ABSENCE OF BOTH CERVIX AND UTERUS: Chronic | ICD-10-CM

## 2023-03-27 LAB
HCT VFR BLD CALC: 35.7 %
HGB BLD-MCNC: 11 G/DL
MCHC RBC-ENTMCNC: 27.9 PG
MCHC RBC-ENTMCNC: 30.8 G/DL
MCV RBC AUTO: 90.6 FL
PLATELET # BLD AUTO: 229 K/UL
PMV BLD: 9.9 FL
RBC # BLD: 3.94 M/UL
RBC # FLD: 14.3 %
WBC # FLD AUTO: 6.48 K/UL

## 2023-03-27 PROCEDURE — 83521 IG LIGHT CHAINS FREE EACH: CPT

## 2023-03-27 PROCEDURE — 80053 COMPREHEN METABOLIC PANEL: CPT

## 2023-03-27 PROCEDURE — 99213 OFFICE O/P EST LOW 20 MIN: CPT

## 2023-03-27 PROCEDURE — 84155 ASSAY OF PROTEIN SERUM: CPT

## 2023-03-27 PROCEDURE — 86334 IMMUNOFIX E-PHORESIS SERUM: CPT

## 2023-03-27 PROCEDURE — 84165 PROTEIN E-PHORESIS SERUM: CPT

## 2023-03-27 PROCEDURE — 85027 COMPLETE CBC AUTOMATED: CPT

## 2023-03-27 NOTE — HISTORY OF PRESENT ILLNESS
[de-identified] : This is a 69 y/o white female who is here today for a follow up visit for MGUS.  In August 2017, she was sent in for initial consultation for monoclonal gammopathy by her primary care physician.  Patient was found to have mild anemia on routine CBC on 2 occasions(11.8 g/dL on 8/8/17). As a result, her PCP performed further w/u which showed an M-spike of 0.6 g/dL (IgG Kappa band). Her ferritin was 62 ng/mL, TIBC 359, Iron 54, folate >20 and B12 488. \par Pt's history is also significant for gastritis and GERD. She also takes NSAIDs for her OA. Last EGD was 5 years ago, which showed gastritis. \par \par  [de-identified] : She is here for follow up.\par Pt recently saw her ophthalmologist and has an abnormality which she is unable to describe.\par Her vision is normal at this time.\par \par 5/13/19:\par Dxed with enteritis in Jan 2019. \par Last colonoscopy 6-7yrs ago. Scheduled to see GI today.\par Denies any complaints currently. \par Denies fever, nausea, vomiting, chest pain, SOB, abdominal pain, bowel and bladder problems.\par Uptodate with mammo. \par \par 12/16/19\par Pt is here for follow up of MGUS.\par Denies fever, nausea, vomiting, chest pain, SOB, abdominal pain, bowel and bladder problems.\par Uptodate with mammo ( 12/2/19) Reviewed. \par pt has been getting cardio w/u for bradycardia.\par C/o fatigue\par \par 6/15/20\par Pt is here for follow up visit.\par No new complaints. Denies bone pain\par Denies fever, nausea, vomiting, chest pain, SOB, abdominal pain, bowel and bladder problems.\par \par 12/28/2020\par ZULEYMA NAVA a 70 year F is here today for follow up for MGUS. \par She offers no new complaints. \par Denies fever, chills, night sweats, weight loss.\par Reports lung nodules, follows Dr Dong. Has annual Ct scan, stable, as per patient nonmalignant. \par Has mammogram appt this week for annual screening. \par Has Hermosillo esophagus, repeat in 3 months. \par \par 6/21/21\par Pt is here for follow up.\par Has been having back pain d/t to prolapsed discs and arthritis.\par Also saw her derm and had skin biopsy on her Rt leg.\par Follows with GI for Barretts esophagus\par \par 1/17/22\par Pt is here for follow up.\par No bone pain. Has CHOPRA, being investigated by pulm and cardio. Going for CT chest has h/o lung nodules. H/O heavy smoking.\par UTD with GI and GYN\par \par 9/12/22\par Pt is here for follow up.\par Feels well. No bone pain\par Follows with Pulm\par 3/27/23\par Pt is here for follow up.\par Was diag with lung nodule. rpt CT chest is ordered.

## 2023-03-27 NOTE — ASSESSMENT
[FreeTextEntry1] : IGG kappa MGUS. \par \par PLAN\par Last myeloma panel has been stable,\par Blood work ordered:\par UTD with  colonoscopy. Up to date with mammo. \par \par # lung nodule : monitor as per Pulm\par \par RTC in 6 months.\par \par

## 2023-03-28 DIAGNOSIS — R91.1 SOLITARY PULMONARY NODULE: ICD-10-CM

## 2023-04-04 LAB
ALBUMIN MFR SERPL ELPH: 61 %
ALBUMIN SERPL ELPH-MCNC: 4.3 G/DL
ALBUMIN SERPL-MCNC: 4.1 G/DL
ALBUMIN/GLOB SERPL: 1.6 RATIO
ALP BLD-CCNC: 62 U/L
ALPHA1 GLOB MFR SERPL ELPH: 4.3 %
ALPHA1 GLOB SERPL ELPH-MCNC: 0.3 G/DL
ALPHA2 GLOB MFR SERPL ELPH: 10 %
ALPHA2 GLOB SERPL ELPH-MCNC: 0.7 G/DL
ALT SERPL-CCNC: 22 U/L
ANION GAP SERPL CALC-SCNC: 12 MMOL/L
AST SERPL-CCNC: 23 U/L
B-GLOBULIN MFR SERPL ELPH: 9.8 %
B-GLOBULIN SERPL ELPH-MCNC: 0.7 G/DL
BILIRUB SERPL-MCNC: <0.2 MG/DL
BUN SERPL-MCNC: 18 MG/DL
CALCIUM SERPL-MCNC: 9.7 MG/DL
CHLORIDE SERPL-SCNC: 110 MMOL/L
CO2 SERPL-SCNC: 22 MMOL/L
CREAT SERPL-MCNC: 0.6 MG/DL
DEPRECATED KAPPA LC FREE/LAMBDA SER: 1.77 RATIO
EGFR: 95 ML/MIN/1.73M2
GAMMA GLOB FLD ELPH-MCNC: 1 G/DL
GAMMA GLOB MFR SERPL ELPH: 14.9 %
GLUCOSE SERPL-MCNC: 91 MG/DL
INTERPRETATION SERPL IEP-IMP: NORMAL
KAPPA LC CSF-MCNC: 0.69 MG/DL
KAPPA LC SERPL-MCNC: 1.22 MG/DL
M PROTEIN MFR SERPL ELPH: 9.2 %
M PROTEIN SPEC IFE-MCNC: NORMAL
MONOCLON BAND OBS SERPL: 0.6 G/DL
POTASSIUM SERPL-SCNC: 4.5 MMOL/L
PROT SERPL-MCNC: 6.7 G/DL
SODIUM SERPL-SCNC: 144 MMOL/L

## 2023-04-05 ENCOUNTER — RESULT REVIEW (OUTPATIENT)
Age: 73
End: 2023-04-05

## 2023-04-05 ENCOUNTER — OUTPATIENT (OUTPATIENT)
Dept: OUTPATIENT SERVICES | Facility: HOSPITAL | Age: 73
LOS: 1 days | End: 2023-04-05
Payer: MEDICARE

## 2023-04-05 DIAGNOSIS — Z00.8 ENCOUNTER FOR OTHER GENERAL EXAMINATION: ICD-10-CM

## 2023-04-05 DIAGNOSIS — R91.1 SOLITARY PULMONARY NODULE: ICD-10-CM

## 2023-04-05 DIAGNOSIS — Z98.890 OTHER SPECIFIED POSTPROCEDURAL STATES: Chronic | ICD-10-CM

## 2023-04-05 DIAGNOSIS — Z90.710 ACQUIRED ABSENCE OF BOTH CERVIX AND UTERUS: Chronic | ICD-10-CM

## 2023-04-05 PROCEDURE — 71250 CT THORAX DX C-: CPT | Mod: 26,MH

## 2023-04-05 PROCEDURE — 71250 CT THORAX DX C-: CPT

## 2023-04-06 DIAGNOSIS — R91.1 SOLITARY PULMONARY NODULE: ICD-10-CM

## 2023-04-19 ENCOUNTER — APPOINTMENT (OUTPATIENT)
Dept: PULMONOLOGY | Facility: CLINIC | Age: 73
End: 2023-04-19
Payer: MEDICARE

## 2023-04-19 VITALS
DIASTOLIC BLOOD PRESSURE: 80 MMHG | HEART RATE: 66 BPM | OXYGEN SATURATION: 97 % | SYSTOLIC BLOOD PRESSURE: 140 MMHG | BODY MASS INDEX: 30.7 KG/M2 | WEIGHT: 191 LBS | HEIGHT: 66 IN | RESPIRATION RATE: 12 BRPM

## 2023-04-19 DIAGNOSIS — U07.1 COVID-19: ICD-10-CM

## 2023-04-19 PROCEDURE — 99213 OFFICE O/P EST LOW 20 MIN: CPT | Mod: CS

## 2023-04-19 NOTE — DISCUSSION/SUMMARY
[FreeTextEntry1] : ASTHMATIC BRONCHITIS ON BREO\par LUNG NODULE/ CHEST CT NOTED\par SOB ON EXERTION/ POS COVID\par PULR REHAB\par CARDIO F/UP

## 2023-04-19 NOTE — REASON FOR VISIT
[Follow-Up] : a follow-up visit [Asthma] : asthma [COPD] : COPD [Shortness of Breath] : shortness of breath [Pulmonary Nodules] : pulmonary nodules

## 2023-04-24 NOTE — H&P CARDIOLOGY - BP NONINVASIVE DIASTOLIC (MM HG)
[facility]  [address] [address]  Dept: [phone]  Dept Fax: [phone]       Cristin Flores :2010 MRN:[id number]    2023 Time Session Began: 0800  Time Session Ended: 0854    This visit was performed via live interactive two-way Video visit with patient's verbal consent.   Clinician Location:Home.  Patient Location: Home.  Verified patient identity:  [x] Yes    Session Type:Therapy 53+ minutes (20965)    Others Present: none    Intervention: Behavioral, Cognitive, Supportive    Suicide/Homicide/Violence Ideation: Denied    If Yes, explain: n/a    Current Outpatient Medications   Medication Sig   • hydrOXYzine (ATARAX) 10 MG tablet Take 1-2 tablets by mouth 4 times daily as needed for Anxiety.   • lurasidone (LATUDA) 20 MG tablet Take 1 tablet by mouth at bedtime. Take with food.     No current facility-administered medications for this visit.       Change in Medication(s) Reported: No  If Yes, explain: n/a    Patient/Family Education Provided: Yes  Patient/Family Displays Understanding: Yes    If No, explain: n/a    Chief complaint in patient's own words: \"social anxiety.\"    Progress Note containing chief complaint and symptoms/problems related to the complaint:    (Data/Action/Response/Plan)    D: Patient said she had a visit with DOMINIQUE Osuna on 2023 and this went well.  There were no changes made with her medication.  She has been using her PRN for anxiety to attend a concert this past weekend and then for school daily, in the morning, one time per day.  When she doesn't take it, the anxiety is \"way worse.\"  Discussed support from family and how that varies depending on how her metal health is.  Discussed social anxiety with a focus on talking to people and worries about what others think of her.  She stated she has been more nauseaus and upset stomach lately.  \"It's not every day but it's a lot of days.\"  She worries about  vomiting and sometimes won't eat due to this.  She may consider talking to her parents about this for possible treatment suggestions or intervention.  She is turning 13 this weekend and is considering applying for a job.  She stated it is helpful to have a routine.  Summer activities include going to [place] with her parents and sister in June and and hang out with friends.       A: Provided support to patient.  Identified triggers to anxiety and social anxiety.  Processed thoughts/feelings regarding family support.  Discussed goals for patient's future.  Identified general coping skills and verbally reviewed safety plan.    R: Patient presented with a neutral mood.  Affect consistent to content.  Appropriate behavior.  Goal directed thought process.  No audio/visual hallucinations.  No safety concerns noted.     P: Patient to utilize skills discussed in session(s) and follow safety plan 100% of the time.    Need for Community Resources Assessed: Yes    Resources Needed: No    If Yes, what resources: n/a    Primary Diagnosis: Major Depressive Disorder, recurrent, moderate, Generalized Anxiety Disorder    Treatment Plan: Unchanged    Discharge Plan: N/A    Next Appointment: 5/15/2023  Sara Waller LPC   84

## 2023-05-25 ENCOUNTER — APPOINTMENT (OUTPATIENT)
Dept: ORTHOPEDIC SURGERY | Facility: CLINIC | Age: 73
End: 2023-05-25
Payer: MEDICARE

## 2023-05-25 PROCEDURE — 99204 OFFICE O/P NEW MOD 45 MIN: CPT

## 2023-05-25 PROCEDURE — 73562 X-RAY EXAM OF KNEE 3: CPT | Mod: 50

## 2023-05-30 NOTE — HISTORY OF PRESENT ILLNESS
[de-identified] : Patient here for evaluation left knee pain.\par Complains of joint line pain\par Positive mechanical symptoms and catching\par \par NAD\par Left knee\par No skin breakdown\par Medial joint line ttp\par Positive karoline\par Negative lachman\par Negative varus/valgus instability\par ROM 0-130\par Pain with forced extension and flexion\par NVI\par Compartments soft and NT\par \par Xray reviewed and significant for left knee mild degenerative changes\par \par mri left knee mmt\par \par plan\par I reviewed findings with the patient.  She has failed conservative management.  The joint line pain is getting worse and is affecting her quality of life.  She will be scheduled for surgery after operative versus nonoperative options were discussed.  She will be scheduled for left knee arthroscopy, medial meniscectomy.

## 2023-05-31 ENCOUNTER — LABORATORY RESULT (OUTPATIENT)
Age: 73
End: 2023-05-31

## 2023-06-14 ENCOUNTER — APPOINTMENT (OUTPATIENT)
Dept: ORTHOPEDIC SURGERY | Facility: AMBULATORY SURGERY CENTER | Age: 73
End: 2023-06-14
Payer: MEDICARE

## 2023-06-14 PROCEDURE — 29881 ARTHRS KNE SRG MNISECTMY M/L: CPT | Mod: LT

## 2023-06-14 PROCEDURE — 29876 ARTHRS KNEE SURG SYNVCT MAJ: CPT | Mod: LT

## 2023-06-14 RX ORDER — OXYCODONE AND ACETAMINOPHEN 5; 325 MG/1; MG/1
5-325 TABLET ORAL
Qty: 12 | Refills: 0 | Status: ACTIVE | COMMUNITY
Start: 2023-06-14 | End: 1900-01-01

## 2023-06-15 RX ORDER — HYDROCODONE BITARTRATE AND ACETAMINOPHEN 7.5; 3 MG/1; MG/1
7.5-3 TABLET ORAL
Qty: 12 | Refills: 0 | Status: ACTIVE | COMMUNITY
Start: 2023-06-15 | End: 1900-01-01

## 2023-06-22 ENCOUNTER — APPOINTMENT (OUTPATIENT)
Dept: ORTHOPEDIC SURGERY | Facility: CLINIC | Age: 73
End: 2023-06-22
Payer: MEDICARE

## 2023-06-22 ENCOUNTER — NON-APPOINTMENT (OUTPATIENT)
Age: 73
End: 2023-06-22

## 2023-06-22 PROCEDURE — 99024 POSTOP FOLLOW-UP VISIT: CPT

## 2023-06-22 NOTE — HISTORY OF PRESENT ILLNESS
[de-identified] : Patient is s/p left knee arthroscopy. \par Doing well.\par Pain controlled.\par \par NAD\par Left knee:\par Incisions healed, c/d/i\par Mild swelling\par ROM 0-120\par Neg Instability\par NVI\par Compartments soft and NT\par \par s/p left knee arthroscopy\par Went over surgery in detail\par Will start PT\par Continue pain control\par fu in 6 weeks\par All questions answered\par

## 2023-07-06 ENCOUNTER — APPOINTMENT (OUTPATIENT)
Dept: ORTHOPEDIC SURGERY | Facility: CLINIC | Age: 73
End: 2023-07-06
Payer: MEDICARE

## 2023-07-06 PROCEDURE — 99213 OFFICE O/P EST LOW 20 MIN: CPT

## 2023-07-06 PROCEDURE — 73562 X-RAY EXAM OF KNEE 3: CPT | Mod: LT

## 2023-07-06 RX ORDER — METHYLPREDNISOLONE 4 MG/1
4 TABLET ORAL
Qty: 1 | Refills: 0 | Status: ACTIVE | COMMUNITY
Start: 2023-07-06 | End: 1900-01-01

## 2023-07-06 RX ORDER — MELOXICAM 7.5 MG/1
7.5 TABLET ORAL
Qty: 30 | Refills: 1 | Status: ACTIVE | COMMUNITY
Start: 2023-07-06 | End: 1900-01-01

## 2023-07-06 NOTE — HISTORY OF PRESENT ILLNESS
[de-identified] : Patient is a 72-year-old female who reports to the office for subsequent reevaluation of her left knee pain.  She had a left knee arthroscopy/medial meniscectomy done on 6/14/2023 by Dr. Tapia.  He states that she was doing well and may have aggravated her knee pain by doing lots of walking recently.  Denies any trauma or injury to the area.  Admits to knee swelling and stiffness.  Walking, certain range of motion, and palpating certain areas of the knee aggravate the patient's pain.  Denies any buckling, locking, or stability.  Denies any numbness or tingling.  Feels a lump on the posterior aspect of her knee and states that she has a history of a Baker's cyst.

## 2023-07-06 NOTE — IMAGING
[de-identified] : Left knee x-ray taken in office today revealed no obvious fractures, subluxations, or dislocations.  Joint space narrowing along with patellofemoral/osteoarthritic changes noted.  Otherwise, no other significant abnormalities were seen.\par \par Left knee exam is as follows: Mild effusion noted.  No erythema or ecchymosis.  Able to perform active straight leg raise.  Knee flexion from 0 to 90 degrees with stiffness and pain.  Palpable tender Baker's cyst posterior aspect of knee. patellofemoral, medial/lateral joint line tenderness to palpation.  Calf is soft and nontender.  Negative Femi's.  Light touch intact throughout.  Mildly antalgic gait.

## 2023-07-06 NOTE — DISCUSSION/SUMMARY
[de-identified] : Explained to the patient that her pain is clinically arthritic status post knee arthroscopy.  Mobic 15 mg PO QD PRN and a Medrol Dosepak was sent to the patient's pharmacy to help alleviate her symptoms.  The patient was advised to rest/ice the area and may alternate with warm compresses as needed. \par \par Patient will continue formal physical therapy as directed.  The patient will follow-up in 6-8 weeks for further evaluation.  Patient may benefit from viscosupplementation injections in the future.  All of the patient's questions/concerns were answered in detail.\par \par

## 2023-07-14 RX ORDER — TRAMADOL HYDROCHLORIDE 50 MG/1
50 TABLET, COATED ORAL
Qty: 10 | Refills: 0 | Status: ACTIVE | COMMUNITY
Start: 2023-07-14 | End: 1900-01-01

## 2023-07-21 ENCOUNTER — FORM ENCOUNTER (OUTPATIENT)
Age: 73
End: 2023-07-21

## 2023-07-27 ENCOUNTER — APPOINTMENT (OUTPATIENT)
Dept: ORTHOPEDIC SURGERY | Facility: CLINIC | Age: 73
End: 2023-07-27
Payer: MEDICARE

## 2023-07-27 PROCEDURE — 99024 POSTOP FOLLOW-UP VISIT: CPT

## 2023-08-03 ENCOUNTER — APPOINTMENT (OUTPATIENT)
Dept: ORTHOPEDIC SURGERY | Facility: CLINIC | Age: 73
End: 2023-08-03

## 2023-08-05 ENCOUNTER — EMERGENCY (EMERGENCY)
Facility: HOSPITAL | Age: 73
LOS: 0 days | Discharge: HOME | End: 2023-08-05
Attending: EMERGENCY MEDICINE
Payer: MEDICARE

## 2023-08-05 VITALS
DIASTOLIC BLOOD PRESSURE: 60 MMHG | HEART RATE: 84 BPM | TEMPERATURE: 98 F | HEIGHT: 66 IN | WEIGHT: 179.9 LBS | SYSTOLIC BLOOD PRESSURE: 125 MMHG | OXYGEN SATURATION: 98 % | RESPIRATION RATE: 18 BRPM

## 2023-08-05 VITALS
SYSTOLIC BLOOD PRESSURE: 155 MMHG | RESPIRATION RATE: 17 BRPM | DIASTOLIC BLOOD PRESSURE: 75 MMHG | HEART RATE: 70 BPM | OXYGEN SATURATION: 98 %

## 2023-08-05 DIAGNOSIS — Z79.01 LONG TERM (CURRENT) USE OF ANTICOAGULANTS: ICD-10-CM

## 2023-08-05 DIAGNOSIS — I10 ESSENTIAL (PRIMARY) HYPERTENSION: ICD-10-CM

## 2023-08-05 DIAGNOSIS — Z98.890 OTHER SPECIFIED POSTPROCEDURAL STATES: Chronic | ICD-10-CM

## 2023-08-05 DIAGNOSIS — Z90.6 ACQUIRED ABSENCE OF OTHER PARTS OF URINARY TRACT: ICD-10-CM

## 2023-08-05 DIAGNOSIS — Z91.041 RADIOGRAPHIC DYE ALLERGY STATUS: ICD-10-CM

## 2023-08-05 DIAGNOSIS — Z90.710 ACQUIRED ABSENCE OF BOTH CERVIX AND UTERUS: ICD-10-CM

## 2023-08-05 DIAGNOSIS — Z87.891 PERSONAL HISTORY OF NICOTINE DEPENDENCE: ICD-10-CM

## 2023-08-05 DIAGNOSIS — Z90.710 ACQUIRED ABSENCE OF BOTH CERVIX AND UTERUS: Chronic | ICD-10-CM

## 2023-08-05 DIAGNOSIS — M25.561 PAIN IN RIGHT KNEE: ICD-10-CM

## 2023-08-05 DIAGNOSIS — E78.5 HYPERLIPIDEMIA, UNSPECIFIED: ICD-10-CM

## 2023-08-05 DIAGNOSIS — M71.22 SYNOVIAL CYST OF POPLITEAL SPACE [BAKER], LEFT KNEE: ICD-10-CM

## 2023-08-05 DIAGNOSIS — M79.89 OTHER SPECIFIED SOFT TISSUE DISORDERS: ICD-10-CM

## 2023-08-05 DIAGNOSIS — Z88.8 ALLERGY STATUS TO OTHER DRUGS, MEDICAMENTS AND BIOLOGICAL SUBSTANCES: ICD-10-CM

## 2023-08-05 DIAGNOSIS — Z86.718 PERSONAL HISTORY OF OTHER VENOUS THROMBOSIS AND EMBOLISM: ICD-10-CM

## 2023-08-05 PROCEDURE — 93970 EXTREMITY STUDY: CPT | Mod: 26

## 2023-08-05 PROCEDURE — 93970 EXTREMITY STUDY: CPT

## 2023-08-05 PROCEDURE — 99284 EMERGENCY DEPT VISIT MOD MDM: CPT | Mod: FS

## 2023-08-05 PROCEDURE — 99284 EMERGENCY DEPT VISIT MOD MDM: CPT | Mod: 25

## 2023-08-05 NOTE — ED PROVIDER NOTE - ATTENDING APP SHARED VISIT CONTRIBUTION OF CARE
Pt presents with pain to the left calf for a few days. Hx of left knee meniscus repair about 3 week ago. Complicated by post operative pain for which pt has been on meds. Pt is flying today. hx of DVT in the past related to hormonal treatment and tob use. On exam the left knee has swelling, no erythema, no significant pain on range of motion. Tenderness and swelling to the left calf. No thigh tenderness. Hx of bakers cyst.

## 2023-08-05 NOTE — ED PROVIDER NOTE - PHYSICAL EXAMINATION
Vital Signs: I have reviewed the initial vital signs.  Constitutional: well-nourished, no acute distress  Musculoskeletal: left lower leg- good ROM of extremity,  no bony tenderness, no deformity, good peripheral pulses  Integumentary: (-) laceration, (-) ecchymosis (+) swelling left knee  Neurologic: awake, alert, extremities’ motor and sensory functions grossly intact, no focal deficits  heme: (-) no adenopathy (-)lymphangitis

## 2023-08-05 NOTE — ED PROVIDER NOTE - PATIENT PORTAL LINK FT
You can access the FollowMyHealth Patient Portal offered by [facility] by registering at the following website: [email or web address]. By joining [email or web address]’s FollowMyHealth portal, you will also be able to view your health information using other applications (apps) compatible with our system.

## 2023-08-05 NOTE — ED PROVIDER NOTE - OBJECTIVE STATEMENT
74 y/o female with hx of provoked dvt in past, not currently on anticoagulation, recent left knee arthroscopic knee surgery on 6/14 presents to the Ed with left knee pain and swelling. patient also with prior hx of bakers cyst. patient currently on tramadol for pain. patient has upcoming flight to [place] tomorrow. no tingling distally. no sob, cp, fevers. c/o throbbing pain

## 2023-08-05 NOTE — ED ADULT NURSE NOTE - NSFALLUNIVINTERV_ED_ALL_ED
Bed/Stretcher in lowest position, wheels locked, appropriate side rails in place/Call bell, personal items and telephone in reach/Instruct patient to call for assistance before getting out of bed/chair/stretcher/Non-slip footwear applied when patient is off stretcher/[place] to call system/Physically safe environment - no spills, clutter or unnecessary equipment/Purposeful proactive rounding/Room/bathroom lighting operational, light cord in reach

## 2023-08-10 NOTE — HISTORY OF PRESENT ILLNESS
[de-identified] : Patient is s/p left knee arthroscopy.  Still having pain  NAD Left knee: Incisions healed, c/d/i Mild swelling diffuse ttp ROM 0-120 Neg Instability NVI Compartments soft and NT  s/p left knee arthroscopy Went over surgery in detail cont cons tx pt pain control fu in 6 weeks, if no improvement consider injections

## 2023-08-11 ENCOUNTER — APPOINTMENT (OUTPATIENT)
Dept: ORTHOPEDIC SURGERY | Facility: CLINIC | Age: 73
End: 2023-08-11
Payer: MEDICARE

## 2023-08-11 ENCOUNTER — APPOINTMENT (OUTPATIENT)
Dept: ORTHOPEDIC SURGERY | Facility: CLINIC | Age: 73
End: 2023-08-11

## 2023-08-11 DIAGNOSIS — M25.562 PAIN IN LEFT KNEE: ICD-10-CM

## 2023-08-11 PROCEDURE — 99213 OFFICE O/P EST LOW 20 MIN: CPT | Mod: 24

## 2023-08-11 RX ORDER — DICLOFENAC SODIUM 50 MG/1
50 TABLET, DELAYED RELEASE ORAL
Qty: 60 | Refills: 1 | Status: ACTIVE | COMMUNITY
Start: 2023-08-11 | End: 1900-01-01

## 2023-08-11 NOTE — DISCUSSION/SUMMARY
[de-identified] : Left knee pain.  History of Present Illness       History of Present Illness Patient is a 72-year-old female who reports to the office for subsequent reevaluation of her left knee pain. She had a left knee arthroscopy/medial meniscectomy done on 6/14/2023 by Dr. Tapia. He states that she was doing well and may have aggravated her knee pain by doing lots of walking recently. Denies any trauma or injury to the area. Admits to knee swelling and stiffness. Walking, certain range of motion, and palpating certain areas of the knee aggravate the patient's pain. Denies any buckling, locking, or stability. Denies any numbness or tingling. Feels a lump on the posterior aspect of her knee.  She went to the ER where they performed a Doppler and ruled out a DVT.  She now admits that her left foot/ankle has been swollen as well.      Allergies Iodinated Contrast Media      Physical Exam     Left knee exam is as follows: Mild effusion noted. No erythema or ecchymosis. Able to perform active straight leg raise. Knee flexion from 0 to 90 degrees with stiffness and pain. Palpable tender Baker's cyst posterior aspect of knee. patellofemoral, medial/lateral joint line tenderness to palpation. Calf is soft and nontender. Negative Femi's. Light touch intact throughout. Mildly antalgic gait.  Ambulation with cane.     Discussion/Summary     Explained to the patient that her pain is clinically arthritic status post knee arthroscopy.  Diclofenac 50mg PO BID was sent to the patient's pharmacy to help alleviate their symptoms.  Advised patient to monitor BP while taking this medication.  The patient was advised to rest/ice the area and may alternate with warm compresses as needed.  Advised Epsom salt and warm water soaks of her ankle and elevation to help with swelling.  The knee and foot/ankle conditioning program from the [facility] was given to the patient so they may try that at home.  She did not want to x-ray her foot or ankle today.  Patient will likely receive a viscosupplementation injection at her next visit.  She will keep her upcoming follow-up appointment.  Patient's questions/concerns were answered in detail.

## 2023-08-25 ENCOUNTER — TRANSCRIPTION ENCOUNTER (OUTPATIENT)
Age: 73
End: 2023-08-25

## 2023-08-25 ENCOUNTER — APPOINTMENT (OUTPATIENT)
Dept: ORTHOPEDIC SURGERY | Facility: CLINIC | Age: 73
End: 2023-08-25
Payer: MEDICARE

## 2023-08-25 DIAGNOSIS — M19.90 UNSPECIFIED OSTEOARTHRITIS, UNSPECIFIED SITE: ICD-10-CM

## 2023-08-25 PROCEDURE — 20611 DRAIN/INJ JOINT/BURSA W/US: CPT | Mod: 58,LT

## 2023-08-25 PROCEDURE — 99024 POSTOP FOLLOW-UP VISIT: CPT

## 2023-08-28 NOTE — HISTORY OF PRESENT ILLNESS
[de-identified] : Patient is s/p left knee arthroscopy.  Still having pain  NAD Left knee: Incisions healed, c/d/i Mild swelling diffuse ttp ROM 0-120 Neg Instability NVI Compartments soft and NT  s/p left knee arthroscopy Went over surgery in detail cont cons tx left knee injection  Large Joint Injection was performed because of pain/rom. Anesthesia: ethyl chloride sprayed topically. 6cc synvisc 1 Medication was injected in the left knee. Patient has tried OTC's including aspirin, Ibuprofen, Aleve etc or prescription NSAIDS, and/or exercises at home and/ or physical therapy without satisfactory response. After verbal consent using sterile preparation and technique. The risks, benefits, and alternatives to cortisone injection were explained in full to the patient. Risks outlined include but are not limited to infection, sepsis, bleeding, scarring, skin discoloration, temporary increase in pain, syncopal episode, failure to resolve symptoms, allergic reaction, symptom recurrence, and elevation of blood sugar in diabetics. Patient understood the risks. All questions were answered. After discussion of options, patient requested an injection. Oral informed consent was obtained and sterile prep was done of the injection site. Sterile technique was utilized for the procedure including the preparation of the solutions used for the injection. Patient tolerated the procedure well. Advised to ice the injection site this evening. Prep with alcohol locally to site. Sterile technique used. Diagnostic ultrasound was performed of the knee  to confirm.

## 2023-08-28 NOTE — HISTORY OF PRESENT ILLNESS
[de-identified] : Patient is s/p left knee arthroscopy.  Still having pain  NAD Left knee: Incisions healed, c/d/i Mild swelling diffuse ttp ROM 0-120 Neg Instability NVI Compartments soft and NT  s/p left knee arthroscopy with symptomatic arthritis Went over surgery in detail cont cons tx left knee injection due to underlying painful arthritis  Large Joint Injection was performed because of pain/rom. Anesthesia: ethyl chloride sprayed topically. 6cc synvisc 1 Medication was injected in the left knee. Patient has tried OTC's including aspirin, Ibuprofen, Aleve etc or prescription NSAIDS, and/or exercises at home and/ or physical therapy without satisfactory response. After verbal consent using sterile preparation and technique. The risks, benefits, and alternatives to cortisone injection were explained in full to the patient. Risks outlined include but are not limited to infection, sepsis, bleeding, scarring, skin discoloration, temporary increase in pain, syncopal episode, failure to resolve symptoms, allergic reaction, symptom recurrence, and elevation of blood sugar in diabetics. Patient understood the risks. All questions were answered. After discussion of options, patient requested an injection. Oral informed consent was obtained and sterile prep was done of the injection site. Sterile technique was utilized for the procedure including the preparation of the solutions used for the injection. Patient tolerated the procedure well. Advised to ice the injection site this evening. Prep with alcohol locally to site. Sterile technique used. Diagnostic ultrasound was performed of the knee  to confirm.

## 2023-09-12 ENCOUNTER — APPOINTMENT (OUTPATIENT)
Dept: ORTHOPEDIC SURGERY | Facility: CLINIC | Age: 73
End: 2023-09-12

## 2023-09-14 ENCOUNTER — APPOINTMENT (OUTPATIENT)
Dept: ORTHOPEDIC SURGERY | Facility: CLINIC | Age: 73
End: 2023-09-14
Payer: MEDICARE

## 2023-09-14 DIAGNOSIS — M17.12 UNILATERAL PRIMARY OSTEOARTHRITIS, LEFT KNEE: ICD-10-CM

## 2023-09-14 PROCEDURE — 99213 OFFICE O/P EST LOW 20 MIN: CPT

## 2023-09-14 PROCEDURE — 73562 X-RAY EXAM OF KNEE 3: CPT | Mod: 50

## 2023-09-14 RX ORDER — IBUPROFEN 600 MG/1
600 TABLET ORAL 3 TIMES DAILY
Qty: 90 | Refills: 1 | Status: ACTIVE | COMMUNITY
Start: 2023-09-14 | End: 1900-01-01

## 2023-09-18 ENCOUNTER — APPOINTMENT (OUTPATIENT)
Dept: HEMATOLOGY ONCOLOGY | Facility: CLINIC | Age: 73
End: 2023-09-18
Payer: MEDICARE

## 2023-09-18 ENCOUNTER — OUTPATIENT (OUTPATIENT)
Dept: OUTPATIENT SERVICES | Facility: HOSPITAL | Age: 73
LOS: 1 days | End: 2023-09-18
Payer: MEDICARE

## 2023-09-18 ENCOUNTER — LABORATORY RESULT (OUTPATIENT)
Age: 73
End: 2023-09-18

## 2023-09-18 DIAGNOSIS — Z98.890 OTHER SPECIFIED POSTPROCEDURAL STATES: Chronic | ICD-10-CM

## 2023-09-18 DIAGNOSIS — J44.9 CHRONIC OBSTRUCTIVE PULMONARY DISEASE, UNSPECIFIED: ICD-10-CM

## 2023-09-18 DIAGNOSIS — Z90.710 ACQUIRED ABSENCE OF BOTH CERVIX AND UTERUS: Chronic | ICD-10-CM

## 2023-09-18 LAB
ALBUMIN SERPL ELPH-MCNC: 4.4 G/DL
ALP BLD-CCNC: 74 U/L
ALT SERPL-CCNC: 21 U/L
ANION GAP SERPL CALC-SCNC: 8 MMOL/L
AST SERPL-CCNC: 20 U/L
BILIRUB SERPL-MCNC: <0.2 MG/DL
BUN SERPL-MCNC: 14 MG/DL
CALCIUM SERPL-MCNC: 9.7 MG/DL
CHLORIDE SERPL-SCNC: 107 MMOL/L
CO2 SERPL-SCNC: 26 MMOL/L
CREAT SERPL-MCNC: 0.6 MG/DL
EGFR: 94 ML/MIN/1.73M2
GLUCOSE SERPL-MCNC: 112 MG/DL
HCT VFR BLD CALC: 34.5 %
HGB BLD-MCNC: 10.9 G/DL
MCHC RBC-ENTMCNC: 28.4 PG
MCHC RBC-ENTMCNC: 31.6 G/DL
MCV RBC AUTO: 89.8 FL
PLATELET # BLD AUTO: 265 K/UL
PMV BLD: 9.2 FL
POTASSIUM SERPL-SCNC: 4.6 MMOL/L
PROT SERPL-MCNC: 6.7 G/DL
RBC # BLD: 3.84 M/UL
RBC # FLD: 14.3 %
RETICS # AUTO: 1.2 %
RETICS AGGREG/RBC NFR: 45.8 K/UL
SODIUM SERPL-SCNC: 141 MMOL/L
WBC # FLD AUTO: 6.45 K/UL

## 2023-09-18 PROCEDURE — 85027 COMPLETE CBC AUTOMATED: CPT

## 2023-09-18 PROCEDURE — 86334 IMMUNOFIX E-PHORESIS SERUM: CPT

## 2023-09-18 PROCEDURE — 99214 OFFICE O/P EST MOD 30 MIN: CPT

## 2023-09-18 PROCEDURE — 85046 RETICYTE/HGB CONCENTRATE: CPT

## 2023-09-18 PROCEDURE — 36415 COLL VENOUS BLD VENIPUNCTURE: CPT

## 2023-09-18 PROCEDURE — 82746 ASSAY OF FOLIC ACID SERUM: CPT

## 2023-09-18 PROCEDURE — 84155 ASSAY OF PROTEIN SERUM: CPT

## 2023-09-18 PROCEDURE — 84165 PROTEIN E-PHORESIS SERUM: CPT

## 2023-09-18 PROCEDURE — 82728 ASSAY OF FERRITIN: CPT

## 2023-09-18 PROCEDURE — 80053 COMPREHEN METABOLIC PANEL: CPT

## 2023-09-18 PROCEDURE — 82607 VITAMIN B-12: CPT

## 2023-09-18 PROCEDURE — 82784 ASSAY IGA/IGD/IGG/IGM EACH: CPT

## 2023-09-19 ENCOUNTER — APPOINTMENT (OUTPATIENT)
Dept: ORTHOPEDIC SURGERY | Facility: CLINIC | Age: 73
End: 2023-09-19
Payer: MEDICARE

## 2023-09-19 DIAGNOSIS — J44.9 CHRONIC OBSTRUCTIVE PULMONARY DISEASE, UNSPECIFIED: ICD-10-CM

## 2023-09-19 LAB
FERRITIN SERPL-MCNC: 22 NG/ML
FOLATE SERPL-MCNC: >20 NG/ML
IGA SER QL IEP: 75 MG/DL
IGG SER QL IEP: 1046 MG/DL
IGM SER QL IEP: 57 MG/DL
VIT B12 SERPL-MCNC: 260 PG/ML

## 2023-09-19 PROCEDURE — 99213 OFFICE O/P EST LOW 20 MIN: CPT

## 2023-10-02 LAB
ALBUMIN MFR SERPL ELPH: 62.2 %
ALBUMIN SERPL-MCNC: 4.1 G/DL
ALBUMIN/GLOB SERPL: 1.6 RATIO
ALPHA1 GLOB MFR SERPL ELPH: 4.2 %
ALPHA1 GLOB SERPL ELPH-MCNC: 0.3 G/DL
ALPHA2 GLOB MFR SERPL ELPH: 10.1 %
ALPHA2 GLOB SERPL ELPH-MCNC: 0.7 G/DL
B-GLOBULIN MFR SERPL ELPH: 9.2 %
B-GLOBULIN SERPL ELPH-MCNC: 0.6 G/DL
GAMMA GLOB FLD ELPH-MCNC: 0.9 G/DL
GAMMA GLOB MFR SERPL ELPH: 14.3 %
INTERPRETATION SERPL IEP-IMP: NORMAL
M PROTEIN MFR SERPL ELPH: 9.8 %
M PROTEIN SPEC IFE-MCNC: NORMAL
MONOCLON BAND OBS SERPL: 0.6 G/DL
PROT SERPL-MCNC: 6.6 G/DL
PROT SERPL-MCNC: 6.6 G/DL

## 2023-10-18 ENCOUNTER — RESULT REVIEW (OUTPATIENT)
Age: 73
End: 2023-10-18

## 2023-10-18 ENCOUNTER — OUTPATIENT (OUTPATIENT)
Dept: OUTPATIENT SERVICES | Facility: HOSPITAL | Age: 73
LOS: 1 days | End: 2023-10-18
Payer: MEDICARE

## 2023-10-18 DIAGNOSIS — Z90.710 ACQUIRED ABSENCE OF BOTH CERVIX AND UTERUS: Chronic | ICD-10-CM

## 2023-10-18 DIAGNOSIS — Z98.890 OTHER SPECIFIED POSTPROCEDURAL STATES: Chronic | ICD-10-CM

## 2023-10-18 DIAGNOSIS — M17.12 UNILATERAL PRIMARY OSTEOARTHRITIS, LEFT KNEE: ICD-10-CM

## 2023-10-18 PROCEDURE — 73564 X-RAY EXAM KNEE 4 OR MORE: CPT | Mod: 26,50

## 2023-10-18 PROCEDURE — 73522 X-RAY EXAM HIPS BI 3-4 VIEWS: CPT

## 2023-10-18 PROCEDURE — 73522 X-RAY EXAM HIPS BI 3-4 VIEWS: CPT | Mod: 26

## 2023-10-18 PROCEDURE — 73564 X-RAY EXAM KNEE 4 OR MORE: CPT | Mod: 50

## 2023-10-19 DIAGNOSIS — M17.12 UNILATERAL PRIMARY OSTEOARTHRITIS, LEFT KNEE: ICD-10-CM

## 2023-10-25 ENCOUNTER — APPOINTMENT (OUTPATIENT)
Dept: PULMONOLOGY | Facility: CLINIC | Age: 73
End: 2023-10-25
Payer: MEDICARE

## 2023-10-25 VITALS
DIASTOLIC BLOOD PRESSURE: 80 MMHG | SYSTOLIC BLOOD PRESSURE: 132 MMHG | WEIGHT: 187 LBS | HEART RATE: 66 BPM | OXYGEN SATURATION: 96 % | BODY MASS INDEX: 30.05 KG/M2 | HEIGHT: 66 IN

## 2023-10-25 PROCEDURE — 99213 OFFICE O/P EST LOW 20 MIN: CPT

## 2023-11-02 ENCOUNTER — RESULT REVIEW (OUTPATIENT)
Age: 73
End: 2023-11-02

## 2023-11-02 ENCOUNTER — OUTPATIENT (OUTPATIENT)
Dept: OUTPATIENT SERVICES | Facility: HOSPITAL | Age: 73
LOS: 1 days | End: 2023-11-02
Payer: MEDICARE

## 2023-11-02 DIAGNOSIS — M25.562 PAIN IN LEFT KNEE: ICD-10-CM

## 2023-11-02 DIAGNOSIS — Z00.8 ENCOUNTER FOR OTHER GENERAL EXAMINATION: ICD-10-CM

## 2023-11-02 DIAGNOSIS — Z90.710 ACQUIRED ABSENCE OF BOTH CERVIX AND UTERUS: Chronic | ICD-10-CM

## 2023-11-02 DIAGNOSIS — Z98.890 OTHER SPECIFIED POSTPROCEDURAL STATES: Chronic | ICD-10-CM

## 2023-11-02 PROCEDURE — 73721 MRI JNT OF LWR EXTRE W/O DYE: CPT | Mod: LT

## 2023-11-02 PROCEDURE — 73721 MRI JNT OF LWR EXTRE W/O DYE: CPT | Mod: 26,LT,MH

## 2023-11-03 DIAGNOSIS — M25.562 PAIN IN LEFT KNEE: ICD-10-CM

## 2023-11-14 ENCOUNTER — APPOINTMENT (OUTPATIENT)
Dept: ORTHOPEDIC SURGERY | Facility: CLINIC | Age: 73
End: 2023-11-14

## 2023-11-24 ENCOUNTER — APPOINTMENT (OUTPATIENT)
Dept: ORTHOPEDIC SURGERY | Facility: CLINIC | Age: 73
End: 2023-11-24

## 2023-11-30 ENCOUNTER — RX RENEWAL (OUTPATIENT)
Age: 73
End: 2023-11-30

## 2023-11-30 RX ORDER — FLUTICASONE FUROATE AND VILANTEROL 100; 25 UG/1; UG/1
100-25 POWDER RESPIRATORY (INHALATION) DAILY
Qty: 60 | Refills: 0 | Status: ACTIVE | COMMUNITY
Start: 2022-11-18 | End: 1900-01-01

## 2023-12-12 RX ORDER — FLUTICASONE PROPIONATE AND SALMETEROL 50; 250 UG/1; UG/1
250-50 POWDER RESPIRATORY (INHALATION)
Qty: 1 | Refills: 3 | Status: ACTIVE | COMMUNITY
Start: 1900-01-01 | End: 1900-01-01

## 2023-12-14 ENCOUNTER — EMERGENCY (EMERGENCY)
Facility: HOSPITAL | Age: 73
LOS: 0 days | Discharge: HOME | End: 2023-12-14
Attending: EMERGENCY MEDICINE
Payer: MEDICARE

## 2023-12-14 VITALS
TEMPERATURE: 99 F | OXYGEN SATURATION: 97 % | WEIGHT: 184.97 LBS | HEIGHT: 66 IN | SYSTOLIC BLOOD PRESSURE: 152 MMHG | RESPIRATION RATE: 20 BRPM | DIASTOLIC BLOOD PRESSURE: 67 MMHG | HEART RATE: 90 BPM

## 2023-12-14 DIAGNOSIS — R05.9 COUGH, UNSPECIFIED: ICD-10-CM

## 2023-12-14 DIAGNOSIS — R09.81 NASAL CONGESTION: ICD-10-CM

## 2023-12-14 DIAGNOSIS — J44.9 CHRONIC OBSTRUCTIVE PULMONARY DISEASE, UNSPECIFIED: ICD-10-CM

## 2023-12-14 DIAGNOSIS — Z90.710 ACQUIRED ABSENCE OF BOTH CERVIX AND UTERUS: Chronic | ICD-10-CM

## 2023-12-14 DIAGNOSIS — Z20.822 CONTACT WITH AND (SUSPECTED) EXPOSURE TO COVID-19: ICD-10-CM

## 2023-12-14 DIAGNOSIS — Z98.890 OTHER SPECIFIED POSTPROCEDURAL STATES: Chronic | ICD-10-CM

## 2023-12-14 DIAGNOSIS — Z88.6 ALLERGY STATUS TO ANALGESIC AGENT: ICD-10-CM

## 2023-12-14 DIAGNOSIS — Z91.041 RADIOGRAPHIC DYE ALLERGY STATUS: ICD-10-CM

## 2023-12-14 DIAGNOSIS — J06.9 ACUTE UPPER RESPIRATORY INFECTION, UNSPECIFIED: ICD-10-CM

## 2023-12-14 LAB
ALBUMIN SERPL ELPH-MCNC: 4.7 G/DL — SIGNIFICANT CHANGE UP (ref 3.5–5.2)
ALBUMIN SERPL ELPH-MCNC: 4.7 G/DL — SIGNIFICANT CHANGE UP (ref 3.5–5.2)
ALP SERPL-CCNC: 140 U/L — HIGH (ref 30–115)
ALP SERPL-CCNC: 140 U/L — HIGH (ref 30–115)
ALT FLD-CCNC: 81 U/L — HIGH (ref 0–41)
ALT FLD-CCNC: 81 U/L — HIGH (ref 0–41)
ANION GAP SERPL CALC-SCNC: 12 MMOL/L — SIGNIFICANT CHANGE UP (ref 7–14)
ANION GAP SERPL CALC-SCNC: 12 MMOL/L — SIGNIFICANT CHANGE UP (ref 7–14)
AST SERPL-CCNC: 66 U/L — HIGH (ref 0–41)
AST SERPL-CCNC: 66 U/L — HIGH (ref 0–41)
BASOPHILS # BLD AUTO: 0.03 K/UL — SIGNIFICANT CHANGE UP (ref 0–0.2)
BASOPHILS # BLD AUTO: 0.03 K/UL — SIGNIFICANT CHANGE UP (ref 0–0.2)
BASOPHILS NFR BLD AUTO: 0.6 % — SIGNIFICANT CHANGE UP (ref 0–1)
BASOPHILS NFR BLD AUTO: 0.6 % — SIGNIFICANT CHANGE UP (ref 0–1)
BILIRUB SERPL-MCNC: 0.3 MG/DL — SIGNIFICANT CHANGE UP (ref 0.2–1.2)
BILIRUB SERPL-MCNC: 0.3 MG/DL — SIGNIFICANT CHANGE UP (ref 0.2–1.2)
BUN SERPL-MCNC: 14 MG/DL — SIGNIFICANT CHANGE UP (ref 10–20)
BUN SERPL-MCNC: 14 MG/DL — SIGNIFICANT CHANGE UP (ref 10–20)
CALCIUM SERPL-MCNC: 9.9 MG/DL — SIGNIFICANT CHANGE UP (ref 8.4–10.5)
CALCIUM SERPL-MCNC: 9.9 MG/DL — SIGNIFICANT CHANGE UP (ref 8.4–10.5)
CHLORIDE SERPL-SCNC: 102 MMOL/L — SIGNIFICANT CHANGE UP (ref 98–110)
CHLORIDE SERPL-SCNC: 102 MMOL/L — SIGNIFICANT CHANGE UP (ref 98–110)
CO2 SERPL-SCNC: 24 MMOL/L — SIGNIFICANT CHANGE UP (ref 17–32)
CO2 SERPL-SCNC: 24 MMOL/L — SIGNIFICANT CHANGE UP (ref 17–32)
CREAT SERPL-MCNC: 0.7 MG/DL — SIGNIFICANT CHANGE UP (ref 0.7–1.5)
CREAT SERPL-MCNC: 0.7 MG/DL — SIGNIFICANT CHANGE UP (ref 0.7–1.5)
EGFR: 91 ML/MIN/1.73M2 — SIGNIFICANT CHANGE UP
EGFR: 91 ML/MIN/1.73M2 — SIGNIFICANT CHANGE UP
EOSINOPHIL # BLD AUTO: 0.04 K/UL — SIGNIFICANT CHANGE UP (ref 0–0.7)
EOSINOPHIL # BLD AUTO: 0.04 K/UL — SIGNIFICANT CHANGE UP (ref 0–0.7)
EOSINOPHIL NFR BLD AUTO: 0.9 % — SIGNIFICANT CHANGE UP (ref 0–8)
EOSINOPHIL NFR BLD AUTO: 0.9 % — SIGNIFICANT CHANGE UP (ref 0–8)
FLUAV AG NPH QL: SIGNIFICANT CHANGE UP
FLUAV AG NPH QL: SIGNIFICANT CHANGE UP
FLUBV AG NPH QL: SIGNIFICANT CHANGE UP
FLUBV AG NPH QL: SIGNIFICANT CHANGE UP
GLUCOSE SERPL-MCNC: 112 MG/DL — HIGH (ref 70–99)
GLUCOSE SERPL-MCNC: 112 MG/DL — HIGH (ref 70–99)
HCT VFR BLD CALC: 37.4 % — SIGNIFICANT CHANGE UP (ref 37–47)
HCT VFR BLD CALC: 37.4 % — SIGNIFICANT CHANGE UP (ref 37–47)
HGB BLD-MCNC: 12 G/DL — SIGNIFICANT CHANGE UP (ref 12–16)
HGB BLD-MCNC: 12 G/DL — SIGNIFICANT CHANGE UP (ref 12–16)
IMM GRANULOCYTES NFR BLD AUTO: 0.2 % — SIGNIFICANT CHANGE UP (ref 0.1–0.3)
IMM GRANULOCYTES NFR BLD AUTO: 0.2 % — SIGNIFICANT CHANGE UP (ref 0.1–0.3)
LYMPHOCYTES # BLD AUTO: 0.68 K/UL — LOW (ref 1.2–3.4)
LYMPHOCYTES # BLD AUTO: 0.68 K/UL — LOW (ref 1.2–3.4)
LYMPHOCYTES # BLD AUTO: 14.6 % — LOW (ref 20.5–51.1)
LYMPHOCYTES # BLD AUTO: 14.6 % — LOW (ref 20.5–51.1)
MCHC RBC-ENTMCNC: 27.8 PG — SIGNIFICANT CHANGE UP (ref 27–31)
MCHC RBC-ENTMCNC: 27.8 PG — SIGNIFICANT CHANGE UP (ref 27–31)
MCHC RBC-ENTMCNC: 32.1 G/DL — SIGNIFICANT CHANGE UP (ref 32–37)
MCHC RBC-ENTMCNC: 32.1 G/DL — SIGNIFICANT CHANGE UP (ref 32–37)
MCV RBC AUTO: 86.6 FL — SIGNIFICANT CHANGE UP (ref 81–99)
MCV RBC AUTO: 86.6 FL — SIGNIFICANT CHANGE UP (ref 81–99)
MONOCYTES # BLD AUTO: 0.49 K/UL — SIGNIFICANT CHANGE UP (ref 0.1–0.6)
MONOCYTES # BLD AUTO: 0.49 K/UL — SIGNIFICANT CHANGE UP (ref 0.1–0.6)
MONOCYTES NFR BLD AUTO: 10.5 % — HIGH (ref 1.7–9.3)
MONOCYTES NFR BLD AUTO: 10.5 % — HIGH (ref 1.7–9.3)
NEUTROPHILS # BLD AUTO: 3.42 K/UL — SIGNIFICANT CHANGE UP (ref 1.4–6.5)
NEUTROPHILS # BLD AUTO: 3.42 K/UL — SIGNIFICANT CHANGE UP (ref 1.4–6.5)
NEUTROPHILS NFR BLD AUTO: 73.2 % — SIGNIFICANT CHANGE UP (ref 42.2–75.2)
NEUTROPHILS NFR BLD AUTO: 73.2 % — SIGNIFICANT CHANGE UP (ref 42.2–75.2)
NRBC # BLD: 0 /100 WBCS — SIGNIFICANT CHANGE UP (ref 0–0)
NRBC # BLD: 0 /100 WBCS — SIGNIFICANT CHANGE UP (ref 0–0)
PLATELET # BLD AUTO: 241 K/UL — SIGNIFICANT CHANGE UP (ref 130–400)
PLATELET # BLD AUTO: 241 K/UL — SIGNIFICANT CHANGE UP (ref 130–400)
PMV BLD: 9.1 FL — SIGNIFICANT CHANGE UP (ref 7.4–10.4)
PMV BLD: 9.1 FL — SIGNIFICANT CHANGE UP (ref 7.4–10.4)
POTASSIUM SERPL-MCNC: 4.2 MMOL/L — SIGNIFICANT CHANGE UP (ref 3.5–5)
POTASSIUM SERPL-MCNC: 4.2 MMOL/L — SIGNIFICANT CHANGE UP (ref 3.5–5)
POTASSIUM SERPL-SCNC: 4.2 MMOL/L — SIGNIFICANT CHANGE UP (ref 3.5–5)
POTASSIUM SERPL-SCNC: 4.2 MMOL/L — SIGNIFICANT CHANGE UP (ref 3.5–5)
PROT SERPL-MCNC: 7.3 G/DL — SIGNIFICANT CHANGE UP (ref 6–8)
PROT SERPL-MCNC: 7.3 G/DL — SIGNIFICANT CHANGE UP (ref 6–8)
RBC # BLD: 4.32 M/UL — SIGNIFICANT CHANGE UP (ref 4.2–5.4)
RBC # BLD: 4.32 M/UL — SIGNIFICANT CHANGE UP (ref 4.2–5.4)
RBC # FLD: 14.6 % — HIGH (ref 11.5–14.5)
RBC # FLD: 14.6 % — HIGH (ref 11.5–14.5)
RSV RNA NPH QL NAA+NON-PROBE: DETECTED
RSV RNA NPH QL NAA+NON-PROBE: DETECTED
SARS-COV-2 RNA SPEC QL NAA+PROBE: SIGNIFICANT CHANGE UP
SARS-COV-2 RNA SPEC QL NAA+PROBE: SIGNIFICANT CHANGE UP
SODIUM SERPL-SCNC: 138 MMOL/L — SIGNIFICANT CHANGE UP (ref 135–146)
SODIUM SERPL-SCNC: 138 MMOL/L — SIGNIFICANT CHANGE UP (ref 135–146)
TROPONIN T SERPL-MCNC: <0.01 NG/ML — SIGNIFICANT CHANGE UP
TROPONIN T SERPL-MCNC: <0.01 NG/ML — SIGNIFICANT CHANGE UP
WBC # BLD: 4.67 K/UL — LOW (ref 4.8–10.8)
WBC # BLD: 4.67 K/UL — LOW (ref 4.8–10.8)
WBC # FLD AUTO: 4.67 K/UL — LOW (ref 4.8–10.8)
WBC # FLD AUTO: 4.67 K/UL — LOW (ref 4.8–10.8)

## 2023-12-14 PROCEDURE — 85025 COMPLETE CBC W/AUTO DIFF WBC: CPT

## 2023-12-14 PROCEDURE — 93005 ELECTROCARDIOGRAM TRACING: CPT

## 2023-12-14 PROCEDURE — 99285 EMERGENCY DEPT VISIT HI MDM: CPT | Mod: 25

## 2023-12-14 PROCEDURE — 36415 COLL VENOUS BLD VENIPUNCTURE: CPT

## 2023-12-14 PROCEDURE — 71046 X-RAY EXAM CHEST 2 VIEWS: CPT

## 2023-12-14 PROCEDURE — 84484 ASSAY OF TROPONIN QUANT: CPT

## 2023-12-14 PROCEDURE — 93010 ELECTROCARDIOGRAM REPORT: CPT

## 2023-12-14 PROCEDURE — 71046 X-RAY EXAM CHEST 2 VIEWS: CPT | Mod: 26

## 2023-12-14 PROCEDURE — 99284 EMERGENCY DEPT VISIT MOD MDM: CPT

## 2023-12-14 PROCEDURE — 0241U: CPT

## 2023-12-14 PROCEDURE — 96372 THER/PROPH/DIAG INJ SC/IM: CPT

## 2023-12-14 PROCEDURE — 80053 COMPREHEN METABOLIC PANEL: CPT

## 2023-12-14 RX ORDER — AZITHROMYCIN 500 MG/1
1 TABLET, FILM COATED ORAL
Qty: 1 | Refills: 0
Start: 2023-12-14

## 2023-12-14 RX ORDER — AZITHROMYCIN 500 MG/1
1 TABLET, FILM COATED ORAL
Qty: 1 | Refills: 0
Start: 2023-12-14 | End: 2023-12-18

## 2023-12-14 RX ORDER — DEXAMETHASONE 0.5 MG/5ML
10 ELIXIR ORAL ONCE
Refills: 0 | Status: COMPLETED | OUTPATIENT
Start: 2023-12-14 | End: 2023-12-14

## 2023-12-14 RX ADMIN — Medication 10 MILLIGRAM(S): at 09:14

## 2023-12-14 NOTE — ED PROVIDER NOTE - OBJECTIVE STATEMENT
73-year-old female to ED with cough congestion URI.  No fevers no sick contacts or travels or trauma.  Persistent symptoms since Thanksgiving and no relief with OTC meds.  Cough has been intermittent dry mainly worse at night.  History of COPD but no acute symptoms and no wheezing.

## 2023-12-14 NOTE — ED ADULT NURSE NOTE - NS ED NURSE DISCH DISPOSITION
Discharged O-T Advancement Flap Text: The defect edges were debeveled with a #15 scalpel blade.  Given the location of the defect, shape of the defect and the proximity to free margins an O-T advancement flap was deemed most appropriate.  Using a sterile surgical marker, an appropriate advancement flap was drawn incorporating the defect and placing the expected incisions within the relaxed skin tension lines where possible.    The area thus outlined was incised deep to adipose tissue with a #15 scalpel blade.  The skin margins were undermined to an appropriate distance in all directions utilizing iris scissors.

## 2023-12-14 NOTE — ED ADULT TRIAGE NOTE - CHIEF COMPLAINT QUOTE
"I have been sick since Thanksgiving with a cough. I've been taking Mucinex (which raised my blood pressure) and now OTC Coricidin which is doing nothing. Last night I developed chest pain when coughing."

## 2023-12-14 NOTE — ED ADULT NURSE NOTE - NSFALLUNIVINTERV_ED_ALL_ED
Bed/Stretcher in lowest position, wheels locked, appropriate side rails in place/Call bell, personal items and telephone in reach/Instruct patient to call for assistance before getting out of bed/chair/stretcher/Non-slip footwear applied when patient is off stretcher/[place] to call system/Physically safe environment - no spills, clutter or unnecessary equipment/Purposeful proactive rounding/Room/bathroom lighting operational, light cord in reach Bed/Stretcher in lowest position, wheels locked, appropriate side rails in place/Call bell, personal items and telephone in reach/Instruct patient to call for assistance before getting out of bed/chair/stretcher/Non-slip footwear applied when patient is off stretcher/[place] to call system/Physically safe environment - no spills, clutter or unnecessary equipment/Purposeful proactive rounding/Room/bathroom lighting operational, light cord in reach

## 2023-12-14 NOTE — ED PROVIDER NOTE - PATIENT PORTAL LINK FT
You can access the FollowMyHealth Patient Portal offered by [facility] by registering at the following website: [email or web address]. By joining [facility]’s FollowMyHealth portal, you will also be able to view your health information using other applications (apps) compatible with our system. You can access the FollowMyHealth Patient Portal offered by [facility] by registering at the following website: [email or web address]. By joining [facility]’s FollowMyHealth portal, you will also be able to view your health information using other applications (apps) compatible with our system.

## 2023-12-17 ENCOUNTER — RX RENEWAL (OUTPATIENT)
Age: 73
End: 2023-12-17

## 2023-12-17 RX ORDER — ALBUTEROL SULFATE 90 UG/1
108 (90 BASE) AEROSOL, METERED RESPIRATORY (INHALATION)
Qty: 18 | Refills: 0 | Status: ACTIVE | COMMUNITY
Start: 2022-11-08 | End: 1900-01-01

## 2024-01-03 ENCOUNTER — APPOINTMENT (OUTPATIENT)
Dept: PULMONOLOGY | Facility: CLINIC | Age: 74
End: 2024-01-03
Payer: MEDICARE

## 2024-01-03 VITALS
OXYGEN SATURATION: 96 % | HEIGHT: 66 IN | HEART RATE: 86 BPM | SYSTOLIC BLOOD PRESSURE: 130 MMHG | WEIGHT: 185 LBS | DIASTOLIC BLOOD PRESSURE: 86 MMHG | BODY MASS INDEX: 29.73 KG/M2

## 2024-01-03 DIAGNOSIS — R06.02 SHORTNESS OF BREATH: ICD-10-CM

## 2024-01-03 PROCEDURE — 99213 OFFICE O/P EST LOW 20 MIN: CPT

## 2024-01-03 RX ORDER — BENZONATATE 200 MG/1
200 CAPSULE ORAL
Qty: 30 | Refills: 1 | Status: ACTIVE | COMMUNITY
Start: 2024-01-03 | End: 1900-01-01

## 2024-01-03 NOTE — DISCUSSION/SUMMARY
[FreeTextEntry1] : ASTHMATIC BRONCHITIS ON BREO SP RSV SOB ON EXERTION INTERMITTENT COUGH LUNG NODULES CARDIO F/UP PUL REHAB

## 2024-01-04 ENCOUNTER — RX RENEWAL (OUTPATIENT)
Age: 74
End: 2024-01-04

## 2024-01-04 RX ORDER — FLUTICASONE PROPIONATE 50 UG/1
50 SPRAY, METERED NASAL TWICE DAILY
Qty: 48 | Refills: 0 | Status: ACTIVE | COMMUNITY
Start: 2024-01-03 | End: 1900-01-01

## 2024-01-08 ENCOUNTER — RX RENEWAL (OUTPATIENT)
Age: 74
End: 2024-01-08

## 2024-02-08 ENCOUNTER — RX RENEWAL (OUTPATIENT)
Age: 74
End: 2024-02-08

## 2024-02-13 ENCOUNTER — APPOINTMENT (OUTPATIENT)
Dept: ORTHOPEDIC SURGERY | Facility: CLINIC | Age: 74
End: 2024-02-13

## 2024-02-27 ENCOUNTER — OUTPATIENT (OUTPATIENT)
Dept: OUTPATIENT SERVICES | Facility: HOSPITAL | Age: 74
LOS: 1 days | End: 2024-02-27
Payer: MEDICARE

## 2024-02-27 ENCOUNTER — RESULT REVIEW (OUTPATIENT)
Age: 74
End: 2024-02-27

## 2024-02-27 DIAGNOSIS — Z90.710 ACQUIRED ABSENCE OF BOTH CERVIX AND UTERUS: Chronic | ICD-10-CM

## 2024-02-27 DIAGNOSIS — Z98.890 OTHER SPECIFIED POSTPROCEDURAL STATES: Chronic | ICD-10-CM

## 2024-02-27 DIAGNOSIS — R06.00 DYSPNEA, UNSPECIFIED: ICD-10-CM

## 2024-02-27 PROCEDURE — 71250 CT THORAX DX C-: CPT | Mod: 26

## 2024-02-27 PROCEDURE — 71250 CT THORAX DX C-: CPT

## 2024-02-28 DIAGNOSIS — R06.00 DYSPNEA, UNSPECIFIED: ICD-10-CM

## 2024-03-04 ENCOUNTER — APPOINTMENT (OUTPATIENT)
Dept: HEMATOLOGY ONCOLOGY | Facility: CLINIC | Age: 74
End: 2024-03-04
Payer: MEDICARE

## 2024-03-04 ENCOUNTER — LABORATORY RESULT (OUTPATIENT)
Age: 74
End: 2024-03-04

## 2024-03-04 ENCOUNTER — OUTPATIENT (OUTPATIENT)
Dept: OUTPATIENT SERVICES | Facility: HOSPITAL | Age: 74
LOS: 1 days | End: 2024-03-04
Payer: MEDICARE

## 2024-03-04 DIAGNOSIS — D64.9 ANEMIA, UNSPECIFIED: ICD-10-CM

## 2024-03-04 DIAGNOSIS — Z98.890 OTHER SPECIFIED POSTPROCEDURAL STATES: Chronic | ICD-10-CM

## 2024-03-04 DIAGNOSIS — D47.2 MONOCLONAL GAMMOPATHY: ICD-10-CM

## 2024-03-04 DIAGNOSIS — Z90.710 ACQUIRED ABSENCE OF BOTH CERVIX AND UTERUS: Chronic | ICD-10-CM

## 2024-03-04 LAB
ALBUMIN SERPL ELPH-MCNC: 4.6 G/DL
ALP BLD-CCNC: 76 U/L
ALT SERPL-CCNC: 24 U/L
ANION GAP SERPL CALC-SCNC: 11 MMOL/L
AST SERPL-CCNC: 23 U/L
BASOPHILS # BLD AUTO: 0.04 K/UL
BASOPHILS NFR BLD AUTO: 0.5 %
BILIRUB SERPL-MCNC: <0.2 MG/DL
BUN SERPL-MCNC: 16 MG/DL
CALCIUM SERPL-MCNC: 9.9 MG/DL
CHLORIDE SERPL-SCNC: 106 MMOL/L
CO2 SERPL-SCNC: 25 MMOL/L
CREAT SERPL-MCNC: 0.7 MG/DL
EGFR: 91 ML/MIN/1.73M2
EOSINOPHIL # BLD AUTO: 0.12 K/UL
EOSINOPHIL NFR BLD AUTO: 1.6 %
GLUCOSE SERPL-MCNC: 91 MG/DL
HCT VFR BLD CALC: 37.3 %
HGB BLD-MCNC: 11.6 G/DL
IMM GRANULOCYTES NFR BLD AUTO: 0.3 %
LYMPHOCYTES # BLD AUTO: 1.49 K/UL
LYMPHOCYTES NFR BLD AUTO: 19.6 %
MAN DIFF?: NORMAL
MCHC RBC-ENTMCNC: 28.2 PG
MCHC RBC-ENTMCNC: 31.1 G/DL
MCV RBC AUTO: 90.8 FL
MONOCYTES # BLD AUTO: 0.63 K/UL
MONOCYTES NFR BLD AUTO: 8.3 %
NEUTROPHILS # BLD AUTO: 5.31 K/UL
NEUTROPHILS NFR BLD AUTO: 69.7 %
PLATELET # BLD AUTO: 214 K/UL
PMV BLD AUTO: 0 /100 WBCS
POTASSIUM SERPL-SCNC: 4.9 MMOL/L
PROT SERPL-MCNC: 6.9 G/DL
RBC # BLD: 4.11 M/UL
RBC # FLD: 14.8 %
SODIUM SERPL-SCNC: 142 MMOL/L
WBC # FLD AUTO: 7.61 K/UL

## 2024-03-04 PROCEDURE — 99214 OFFICE O/P EST MOD 30 MIN: CPT

## 2024-03-04 PROCEDURE — 82784 ASSAY IGA/IGD/IGG/IGM EACH: CPT

## 2024-03-04 PROCEDURE — G2211 COMPLEX E/M VISIT ADD ON: CPT

## 2024-03-04 PROCEDURE — 85027 COMPLETE CBC AUTOMATED: CPT

## 2024-03-04 PROCEDURE — 84165 PROTEIN E-PHORESIS SERUM: CPT

## 2024-03-04 PROCEDURE — 86334 IMMUNOFIX E-PHORESIS SERUM: CPT

## 2024-03-04 PROCEDURE — 84155 ASSAY OF PROTEIN SERUM: CPT

## 2024-03-04 PROCEDURE — 80053 COMPREHEN METABOLIC PANEL: CPT

## 2024-03-04 PROCEDURE — 83521 IG LIGHT CHAINS FREE EACH: CPT

## 2024-03-04 NOTE — ASSESSMENT
[FreeTextEntry1] : IGG kappa MGUS stable. # Mild anemia : with borderline ferritin and B12 levels: Improved  Labs ordered  PLAN Last myeloma panel has been stable, Blood work ordered: Due for colonoscopy this year  # lung nodule : new 1.5cm pleural based nodule Rt lung base. Needs pulm eval   RTC in 6 months or earlier.

## 2024-03-04 NOTE — HISTORY OF PRESENT ILLNESS
[de-identified] : This is a 69 y/o white female who is here today for a follow up visit for MGUS.  In August 2017, she was sent in for initial consultation for monoclonal gammopathy by her primary care physician.  Patient was found to have mild anemia on routine CBC on 2 occasions(11.8 g/dL on 8/8/17). As a result, her PCP performed further w/u which showed an M-spike of 0.6 g/dL (IgG Kappa band). Her ferritin was 62 ng/mL, TIBC 359, Iron 54, folate >20 and B12 488. \par  Pt's history is also significant for gastritis and GERD. She also takes NSAIDs for her OA. Last EGD was 5 years ago, which showed gastritis. \par  \par   [de-identified] : She is here for follow up. Pt recently saw her ophthalmologist and has an abnormality which she is unable to describe. Her vision is normal at this time.  5/13/19: Dxed with enteritis in Jan 2019.  Last colonoscopy 6-7yrs ago. Scheduled to see GI today. Denies any complaints currently.  Denies fever, nausea, vomiting, chest pain, SOB, abdominal pain, bowel and bladder problems. Uptodate with mammo.   12/16/19 Pt is here for follow up of MGUS. Denies fever, nausea, vomiting, chest pain, SOB, abdominal pain, bowel and bladder problems. Uptodate with mammo ( 12/2/19) Reviewed.  pt has been getting cardio w/u for bradycardia. C/o fatigue  6/15/20 Pt is here for follow up visit. No new complaints. Denies bone pain Denies fever, nausea, vomiting, chest pain, SOB, abdominal pain, bowel and bladder problems.  12/28/2020 ZULEYMA NAVA a 70 year F is here today for follow up for MGUS.  She offers no new complaints.  Denies fever, chills, night sweats, weight loss. Reports lung nodules, follows Dr Dong. Has annual Ct scan, stable, as per patient nonmalignant.  Has mammogram appt this week for annual screening.  Has [place] esophagus, repeat in 3 months.   6/21/21 Pt is here for follow up. Has been having back pain d/t to prolapsed discs and arthritis. Also saw her derm and had skin biopsy on her Rt leg. Follows with GI for Barretts esophagus  1/17/22 Pt is here for follow up. No bone pain. Has CHOPRA, being investigated by pulm and cardio. Going for CT chest has h/o lung nodules. H/O heavy smoking. UTD with GI and GYN  9/12/22 Pt is here for follow up. Feels well. No bone pain Follows with Pulm 3/27/23 Pt is here for follow up. Was diag with lung nodule. rpt CT chest is ordered.  9/18/23 Pt here for follow up. Had knee surgery, still with pain. Going to see ortho tomorrow  3/4/24 Pt C/o fatigue. had RSV in 12/23, was sick X 2 weeks. Has carpal tunnel syndrome, needs surgery. Last colonoscopy was 4 yrs ago Has H/O impaired vison follows with Dr Askew. Takes B12 po

## 2024-03-05 DIAGNOSIS — D47.2 MONOCLONAL GAMMOPATHY: ICD-10-CM

## 2024-03-05 LAB
DEPRECATED KAPPA LC FREE/LAMBDA SER: 1.81 RATIO
IGA SER QL IEP: 64 MG/DL
IGG SER QL IEP: 1317 MG/DL
IGM SER QL IEP: 66 MG/DL
KAPPA LC CSF-MCNC: 0.67 MG/DL
KAPPA LC SERPL-MCNC: 1.21 MG/DL

## 2024-03-06 LAB
ALBUMIN MFR SERPL ELPH: 60.7 %
ALBUMIN SERPL-MCNC: 4.2 G/DL
ALBUMIN/GLOB SERPL: 1.6 RATIO
ALPHA1 GLOB MFR SERPL ELPH: 4.5 %
ALPHA1 GLOB SERPL ELPH-MCNC: 0.3 G/DL
ALPHA2 GLOB MFR SERPL ELPH: 10.1 %
ALPHA2 GLOB SERPL ELPH-MCNC: 0.7 G/DL
B-GLOBULIN MFR SERPL ELPH: 9.9 %
B-GLOBULIN SERPL ELPH-MCNC: 0.7 G/DL
GAMMA GLOB FLD ELPH-MCNC: 1 G/DL
GAMMA GLOB MFR SERPL ELPH: 14.8 %
INTERPRETATION SERPL IEP-IMP: NORMAL
M PROTEIN MFR SERPL ELPH: 8.7 %
MONOCLON BAND OBS SERPL: 0.6 G/DL
PROT SERPL-MCNC: 6.9 G/DL

## 2024-03-08 ENCOUNTER — APPOINTMENT (OUTPATIENT)
Dept: PULMONOLOGY | Facility: CLINIC | Age: 74
End: 2024-03-08
Payer: MEDICARE

## 2024-03-08 VITALS
BODY MASS INDEX: 30.05 KG/M2 | SYSTOLIC BLOOD PRESSURE: 140 MMHG | WEIGHT: 187 LBS | HEART RATE: 74 BPM | OXYGEN SATURATION: 98 % | RESPIRATION RATE: 14 BRPM | HEIGHT: 66 IN | DIASTOLIC BLOOD PRESSURE: 80 MMHG

## 2024-03-08 PROCEDURE — G2211 COMPLEX E/M VISIT ADD ON: CPT

## 2024-03-08 PROCEDURE — 99213 OFFICE O/P EST LOW 20 MIN: CPT

## 2024-03-08 NOTE — DISCUSSION/SUMMARY
[FreeTextEntry1] : ASTHMATIC BRONCHITIS ON BREO SP RSV NEW LUNG NODULE SOB ON EXERTION INTERMITTENT COUGHPFT

## 2024-03-11 ENCOUNTER — APPOINTMENT (OUTPATIENT)
Dept: HEMATOLOGY ONCOLOGY | Facility: CLINIC | Age: 74
End: 2024-03-11

## 2024-03-20 ENCOUNTER — EMERGENCY (EMERGENCY)
Facility: HOSPITAL | Age: 74
LOS: 0 days | Discharge: HOME | End: 2024-03-20
Attending: EMERGENCY MEDICINE
Payer: MEDICARE

## 2024-03-20 VITALS
DIASTOLIC BLOOD PRESSURE: 97 MMHG | RESPIRATION RATE: 18 BRPM | SYSTOLIC BLOOD PRESSURE: 158 MMHG | OXYGEN SATURATION: 100 % | TEMPERATURE: 98 F | HEART RATE: 64 BPM

## 2024-03-20 VITALS
HEART RATE: 70 BPM | SYSTOLIC BLOOD PRESSURE: 200 MMHG | OXYGEN SATURATION: 100 % | RESPIRATION RATE: 18 BRPM | DIASTOLIC BLOOD PRESSURE: 84 MMHG | HEIGHT: 66 IN | TEMPERATURE: 98 F | WEIGHT: 184.97 LBS

## 2024-03-20 DIAGNOSIS — R42 DIZZINESS AND GIDDINESS: ICD-10-CM

## 2024-03-20 DIAGNOSIS — Z88.6 ALLERGY STATUS TO ANALGESIC AGENT: ICD-10-CM

## 2024-03-20 DIAGNOSIS — Z98.890 OTHER SPECIFIED POSTPROCEDURAL STATES: Chronic | ICD-10-CM

## 2024-03-20 DIAGNOSIS — Z91.041 RADIOGRAPHIC DYE ALLERGY STATUS: ICD-10-CM

## 2024-03-20 DIAGNOSIS — H53.8 OTHER VISUAL DISTURBANCES: ICD-10-CM

## 2024-03-20 DIAGNOSIS — Z90.710 ACQUIRED ABSENCE OF BOTH CERVIX AND UTERUS: Chronic | ICD-10-CM

## 2024-03-20 DIAGNOSIS — I10 ESSENTIAL (PRIMARY) HYPERTENSION: ICD-10-CM

## 2024-03-20 LAB
ANION GAP SERPL CALC-SCNC: 9 MMOL/L — SIGNIFICANT CHANGE UP (ref 7–14)
BASOPHILS # BLD AUTO: 0.04 K/UL — SIGNIFICANT CHANGE UP (ref 0–0.2)
BASOPHILS NFR BLD AUTO: 0.5 % — SIGNIFICANT CHANGE UP (ref 0–1)
BUN SERPL-MCNC: 15 MG/DL — SIGNIFICANT CHANGE UP (ref 10–20)
CALCIUM SERPL-MCNC: 10.2 MG/DL — SIGNIFICANT CHANGE UP (ref 8.4–10.5)
CHLORIDE SERPL-SCNC: 103 MMOL/L — SIGNIFICANT CHANGE UP (ref 98–110)
CO2 SERPL-SCNC: 24 MMOL/L — SIGNIFICANT CHANGE UP (ref 17–32)
CREAT SERPL-MCNC: 0.7 MG/DL — SIGNIFICANT CHANGE UP (ref 0.7–1.5)
EGFR: 91 ML/MIN/1.73M2 — SIGNIFICANT CHANGE UP
EOSINOPHIL # BLD AUTO: 0.1 K/UL — SIGNIFICANT CHANGE UP (ref 0–0.7)
EOSINOPHIL NFR BLD AUTO: 1.3 % — SIGNIFICANT CHANGE UP (ref 0–8)
GLUCOSE SERPL-MCNC: 120 MG/DL — HIGH (ref 70–99)
HCT VFR BLD CALC: 38.4 % — SIGNIFICANT CHANGE UP (ref 37–47)
HGB BLD-MCNC: 12.3 G/DL — SIGNIFICANT CHANGE UP (ref 12–16)
IMM GRANULOCYTES NFR BLD AUTO: 0.4 % — HIGH (ref 0.1–0.3)
LYMPHOCYTES # BLD AUTO: 1.56 K/UL — SIGNIFICANT CHANGE UP (ref 1.2–3.4)
LYMPHOCYTES # BLD AUTO: 19.9 % — LOW (ref 20.5–51.1)
MCHC RBC-ENTMCNC: 28 PG — SIGNIFICANT CHANGE UP (ref 27–31)
MCHC RBC-ENTMCNC: 32 G/DL — SIGNIFICANT CHANGE UP (ref 32–37)
MCV RBC AUTO: 87.5 FL — SIGNIFICANT CHANGE UP (ref 81–99)
MONOCYTES # BLD AUTO: 0.52 K/UL — SIGNIFICANT CHANGE UP (ref 0.1–0.6)
MONOCYTES NFR BLD AUTO: 6.6 % — SIGNIFICANT CHANGE UP (ref 1.7–9.3)
NEUTROPHILS # BLD AUTO: 5.59 K/UL — SIGNIFICANT CHANGE UP (ref 1.4–6.5)
NEUTROPHILS NFR BLD AUTO: 71.3 % — SIGNIFICANT CHANGE UP (ref 42.2–75.2)
NRBC # BLD: 0 /100 WBCS — SIGNIFICANT CHANGE UP (ref 0–0)
PLATELET # BLD AUTO: 332 K/UL — SIGNIFICANT CHANGE UP (ref 130–400)
PMV BLD: 9.6 FL — SIGNIFICANT CHANGE UP (ref 7.4–10.4)
POTASSIUM SERPL-MCNC: 3.9 MMOL/L — SIGNIFICANT CHANGE UP (ref 3.5–5)
POTASSIUM SERPL-SCNC: 3.9 MMOL/L — SIGNIFICANT CHANGE UP (ref 3.5–5)
RBC # BLD: 4.39 M/UL — SIGNIFICANT CHANGE UP (ref 4.2–5.4)
RBC # FLD: 14.6 % — HIGH (ref 11.5–14.5)
SODIUM SERPL-SCNC: 136 MMOL/L — SIGNIFICANT CHANGE UP (ref 135–146)
WBC # BLD: 7.84 K/UL — SIGNIFICANT CHANGE UP (ref 4.8–10.8)
WBC # FLD AUTO: 7.84 K/UL — SIGNIFICANT CHANGE UP (ref 4.8–10.8)

## 2024-03-20 PROCEDURE — 80048 BASIC METABOLIC PNL TOTAL CA: CPT

## 2024-03-20 PROCEDURE — 99285 EMERGENCY DEPT VISIT HI MDM: CPT | Mod: FS

## 2024-03-20 PROCEDURE — 85025 COMPLETE CBC W/AUTO DIFF WBC: CPT

## 2024-03-20 PROCEDURE — 93010 ELECTROCARDIOGRAM REPORT: CPT

## 2024-03-20 PROCEDURE — 96374 THER/PROPH/DIAG INJ IV PUSH: CPT

## 2024-03-20 PROCEDURE — 70450 CT HEAD/BRAIN W/O DYE: CPT | Mod: MC

## 2024-03-20 PROCEDURE — 93005 ELECTROCARDIOGRAM TRACING: CPT

## 2024-03-20 PROCEDURE — 70450 CT HEAD/BRAIN W/O DYE: CPT | Mod: 26,MC

## 2024-03-20 PROCEDURE — 36415 COLL VENOUS BLD VENIPUNCTURE: CPT

## 2024-03-20 PROCEDURE — 99285 EMERGENCY DEPT VISIT HI MDM: CPT | Mod: 25

## 2024-03-20 RX ORDER — METOCLOPRAMIDE HCL 10 MG
10 TABLET ORAL ONCE
Refills: 0 | Status: COMPLETED | OUTPATIENT
Start: 2024-03-20 | End: 2024-03-20

## 2024-03-20 RX ORDER — SODIUM CHLORIDE 9 MG/ML
1000 INJECTION, SOLUTION INTRAVENOUS ONCE
Refills: 0 | Status: COMPLETED | OUTPATIENT
Start: 2024-03-20 | End: 2024-03-20

## 2024-03-20 RX ORDER — MECLIZINE HCL 12.5 MG
1 TABLET ORAL
Qty: 28 | Refills: 0
Start: 2024-03-20 | End: 2024-03-26

## 2024-03-20 RX ORDER — MECLIZINE HCL 12.5 MG
50 TABLET ORAL ONCE
Refills: 0 | Status: COMPLETED | OUTPATIENT
Start: 2024-03-20 | End: 2024-03-20

## 2024-03-20 RX ADMIN — Medication 50 MILLIGRAM(S): at 17:33

## 2024-03-20 RX ADMIN — SODIUM CHLORIDE 1000 MILLILITER(S): 9 INJECTION, SOLUTION INTRAVENOUS at 17:33

## 2024-03-20 RX ADMIN — Medication 10 MILLIGRAM(S): at 17:53

## 2024-03-20 NOTE — ED PROVIDER NOTE - NSFOLLOWUPINSTRUCTIONS_ED_ALL_ED_FT
SEE YOUR PHYSICAL THERAPIST FOR VESTIBULAR THERAPY AS DISCUSSED    Our Emergency Department Referral Coordinators will be reaching out to you in the next 24-48 hours from 9:00am to 5:00pm with a follow up appointment. Please expect a phone call from the hospital in that time frame. If you do not receive a call or if you have any questions or concerns, you can reach them at   [phone]

## 2024-03-20 NOTE — ED PROVIDER NOTE - PHYSICAL EXAMINATION
VITAL SIGNS: I have reviewed nursing notes and confirm.  CONSTITUTIONAL: Well-developed; well-nourished  SKIN: skin exam is warm and dry, no acute rash.    HEAD: Normocephalic; atraumatic.  EYES:  No nystagmus, conjunctiva and sclera clear.  ENT: No nasal discharge; airway clear.  CARD: S1, S2 normal; no murmurs, gallops, or rubs. Regular rate and rhythm.   RESP: No wheezes, rales or rhonchi.  ABD: Normal bowel sounds; soft; non-distended; non-tender  EXT: Normal ROM.  No clubbing, cyanosis or edema.   NEURO:  ambulating well steady gait Alert, oriented, grossly unremarkable

## 2024-03-20 NOTE — ED PROVIDER NOTE - CLINICAL SUMMARY MEDICAL DECISION MAKING FREE TEXT BOX
73yF HTN p/w postural dizziness (vertigo) that started today.  Pt well appearing w/o focal neuro deficits.  EKG w/o new ischemia or sig arrhythmia.  CTH w/o acute pathology.  Labs reassuring.  Pt feeling better with meds.  Recommend supportive care, o/p PCP vs ENT vs neuro f/u, return precautions.

## 2024-03-20 NOTE — ED ADULT TRIAGE NOTE - CHIEF COMPLAINT QUOTE
Pt states " I feel dizzy when Im lying flat. This started at 11:30am. I do not feel myself"  Pt neuro exam intact

## 2024-03-20 NOTE — ED PROVIDER NOTE - PATIENT PORTAL LINK FT
VAD/Transplant/Advanced Heart Failure Cardiology - Outpatient Telephone Follow-up Note    Guideline directed medical therapy (GDMT) reviewed and confirmed with the patient.  • Adjustments needed:  N/A  • Indication for adjustments to GDMT:  N/A  • Refills needed:  None  • Other:  N/A    Patient's current status was evaluated.   • Current symptoms (QUINONES, PND, orthopnea, BLE edema, abdominal distention, nausea, vomiting,dizziness, lightheadedness, syncope, decrease appetite, early satiety, AICD shocks):  Patient denies   • Weight:  Stable   • Directed to the ER (if applicable):  N/A  Other:  /80    Follow-up appointment date and time confirmed with the patient.  • Date/Time of next appointment:  6/28/23  • Order provided to  for follow-up appointment (if applicable):  N/A  • Post-hospital discharge call (if applicable):  N/A    Lab results reviewed and discussed with the patient (if applicable).    • Concerning lab values:  Stable   • Adjustments needed to GDMT: N/A  • Date/Time of next lab draw:  7/17/23    Home inotrope therapy    • Med/ dose: Milrinone  0.375 mcg/kg/min  HHRN contact information confirmed with patient (Name/Phone Number): N/A    Patient verbally educated regarding indications to contact our team:  fever, drainage/redness/pain    at the PICC line site, chills, diaphoresis, or any other PICC line issues  • Patient was verbally educated on the importance of the inotrope continuously infusing  • PICC line type, insertion date, and site: 5/31/23    Patient provided the 24-hour answering service number for the team.  Patient also verbally educated regarding indications to contact the team.    • Indications to contact the team:  Worsening or changes in above heart failure symptoms and/or clinical status, AICD shock, weight gain, issues    with medication refills, need for medication refills, or any other questions or concerns    Patient verbalized understanding and agreement to the above  information/education.    Additional documentation (if applicable):    Denies any symptoms and feels good.  He will call us if any new symptoms or has any needs. Dexa scan is scheduled for 9/5/23. States he was going to start Cardiac Rehab this month, but [facility] does not have any openings and he would have a high out of pocket cost ($300 per session).  He is being more active at home now and working out.   His PICC line is not drawing back blood but infusing well.  Will call HHRN to give orders for alteplase.          You can access the FollowMyHealth Patient Portal offered by [facility] by registering at the following website: [email or web address]. By joining [facility]’s FollowMyHealth portal, you will also be able to view your health information using other applications (apps) compatible with our system.

## 2024-03-20 NOTE — ED PROVIDER NOTE - ATTENDING APP SHARED VISIT CONTRIBUTION OF CARE
73yF p/w intermittent dizziness, mostly with lying flat, also associated w/ mildly blurred vision.  No CP or SOB.

## 2024-03-20 NOTE — ED PROVIDER NOTE - OBJECTIVE STATEMENT
Patient is a 73-year-old female history of hypertension here for evaluation of positional dizziness that began today.  Patient first noticed it when she was at physical therapy today for her lower back and her physical therapist lowered her in the stretcher.  Patient denies chest pain, shortness of breath, fever, chills, weakness, numbness

## 2024-03-21 NOTE — CHART NOTE - NSCHARTNOTEFT_GEN_A_CORE
[facility] MRN [id number] / Pt already has established care 3/21 - JL    Specialty: ENT

## 2024-04-18 ENCOUNTER — RX RENEWAL (OUTPATIENT)
Age: 74
End: 2024-04-18

## 2024-05-15 ENCOUNTER — APPOINTMENT (OUTPATIENT)
Dept: PULMONOLOGY | Facility: CLINIC | Age: 74
End: 2024-05-15

## 2024-05-19 ENCOUNTER — RX RENEWAL (OUTPATIENT)
Age: 74
End: 2024-05-19

## 2024-05-23 ENCOUNTER — OUTPATIENT (OUTPATIENT)
Dept: OUTPATIENT SERVICES | Facility: HOSPITAL | Age: 74
LOS: 1 days | End: 2024-05-23
Payer: MEDICARE

## 2024-05-23 ENCOUNTER — RESULT REVIEW (OUTPATIENT)
Age: 74
End: 2024-05-23

## 2024-05-23 DIAGNOSIS — R91.1 SOLITARY PULMONARY NODULE: ICD-10-CM

## 2024-05-23 DIAGNOSIS — Z00.8 ENCOUNTER FOR OTHER GENERAL EXAMINATION: ICD-10-CM

## 2024-05-23 DIAGNOSIS — Z98.890 OTHER SPECIFIED POSTPROCEDURAL STATES: Chronic | ICD-10-CM

## 2024-05-23 DIAGNOSIS — Z90.710 ACQUIRED ABSENCE OF BOTH CERVIX AND UTERUS: Chronic | ICD-10-CM

## 2024-05-23 PROCEDURE — 71250 CT THORAX DX C-: CPT | Mod: 26

## 2024-05-23 PROCEDURE — 71250 CT THORAX DX C-: CPT

## 2024-05-24 DIAGNOSIS — R91.1 SOLITARY PULMONARY NODULE: ICD-10-CM

## 2024-05-28 ENCOUNTER — APPOINTMENT (OUTPATIENT)
Dept: ORTHOPEDIC SURGERY | Facility: CLINIC | Age: 74
End: 2024-05-28
Payer: MEDICARE

## 2024-05-28 PROCEDURE — 99213 OFFICE O/P EST LOW 20 MIN: CPT

## 2024-05-28 NOTE — IMAGING
[de-identified] : Physical examination of the right thumb: Mild swelling and ecchymosis appreciated throughout.  No erythema is appreciated.  Tense palpation of the dorsal aspect and IP joint of the right thumb.  Palpable deformity appreciated the dorsal aspect of the right thumb over the distal phalanx.  No malrotation or deviation is appreciated.  No extensor lag.  Sensorimotor intact distally. Neurovascular intact.  X-rays of the right thumb were downloaded into our PACS system from the urgent care and reveal an acute closed nondisplaced fracture of the distal phalanx of the right thumb.  Fracture is extra-articular.  No subluxations or dislocations.

## 2024-05-28 NOTE — DISCUSSION/SUMMARY
[de-identified] : Patient has an acute closed nondisplaced extra-articular fracture of the distal phalanx of the right thumb.  No malrotation, extensor lag, or deviation appreciated on x-ray.  X-rays were discussed in depth.  I placed the patient in an aluminum foam splint on the volar aspect of the right thumb to immobilize the IP joint.  She understands these fractures take a total of 4 to 6 weeks to fully heal.  Red flag symptoms were discussed.  Prognosis was discussed.  Possibility of surgery was discussed.  She expresses full understanding. All questions and concerns addressed to patient's satisfaction. Patient expresses full understanding of treatment plan. I will have the patient follow-up with Dr. Braun in 2 weeks for repeat x-rays and further evaluation/treatment.

## 2024-05-28 NOTE — HISTORY OF PRESENT ILLNESS
[de-identified] : 73-year-old female for evaluation status post right thumb fracture.  Patient reports 2 days ago she fell on a hyperflexed right thumb when playing with her grandchildren.  Patient was seen at [facility] MD where x-rays taken splinted acute closed minimally displaced fracture of the distal phalanx of the right thumb.  Denies any numbness or tingling.

## 2024-05-31 NOTE — ED ADULT NURSE NOTE - TEMPLATE
Fracture was reduced with manipulation and placement of a [place] elevator into the fracture to unhinge the volar cortex. Reduction was confirmed with fluoroscopy.  A distal radius plate was chosen and placed along the volar surface.  Once plate position was confirmed, the plate was secured to the bone with lisette wires through the aiming guides.  I then drilled, measured, and placed nonlocking screw into the oblong hole of the plate. Six locking pegs of appropriate length were then placed distally.  The plate was secured at the shaft with two additional locking screws . AP, Lateral, Lunate facet, and skyline views were then obtained to ensure appropriate fracture reduction, and that no screws penetrated the distal radial articular surface or dorsal cortex. The volar lunate facet fragment was very distal, and not well captured by the plate, so I chose place a hook plate extension. I placed 2 guidewires through the hook plate guide, and confirmed appropriate position on fluoroscopy. The wires were sequentially removed, and the hook plate was placed, followed by the set screw.  The DRUJ was then assessed, and found to be stable in supination, neutral and pronation.     The tourniquet was deflated and hemostasis was ensured.  The wounds were then thoroughly irrigated and closed in layered fashion with 4-0 vicryl and 4-0 stratafix.    A sterile dressing was applied followed by a Volar splint    Next I addressed the left side. A non-sterile tourniquet was placed on the arm, and the arm was prepped and draped in the usual sterile fashion    An incisional timeout was performed re-confirming the correct patient, surgical site and procedure, as well as verifying antibiotics.    A volar FCR approach was utilized.  Dissection was taken down through skin and subcutaneous tissue.  Hemostasis was achieved with bipolar cautery.   The FCR sheath was incised and it was retracted ulnarly.  The FCR subsheath was incised.  The 
General

## 2024-06-04 ENCOUNTER — APPOINTMENT (OUTPATIENT)
Dept: PULMONOLOGY | Facility: CLINIC | Age: 74
End: 2024-06-04
Payer: MEDICARE

## 2024-06-04 VITALS
WEIGHT: 182 LBS | RESPIRATION RATE: 14 BRPM | OXYGEN SATURATION: 96 % | HEART RATE: 71 BPM | BODY MASS INDEX: 29.25 KG/M2 | DIASTOLIC BLOOD PRESSURE: 80 MMHG | SYSTOLIC BLOOD PRESSURE: 120 MMHG | HEIGHT: 66 IN

## 2024-06-04 DIAGNOSIS — R91.1 SOLITARY PULMONARY NODULE: ICD-10-CM

## 2024-06-04 DIAGNOSIS — J44.9 CHRONIC OBSTRUCTIVE PULMONARY DISEASE, UNSPECIFIED: ICD-10-CM

## 2024-06-04 DIAGNOSIS — J45.909 UNSPECIFIED ASTHMA, UNCOMPLICATED: ICD-10-CM

## 2024-06-04 PROCEDURE — G2211 COMPLEX E/M VISIT ADD ON: CPT

## 2024-06-04 PROCEDURE — 99213 OFFICE O/P EST LOW 20 MIN: CPT

## 2024-06-04 NOTE — DISCUSSION/SUMMARY
[FreeTextEntry1] : ASTHMATIC BRONCHITIS ON BREO STABLE NEW LUNG NODULE REPEAT CT NOTED SOB ON EXERTION INTERMITTENT COUGH PFT

## 2024-06-14 ENCOUNTER — APPOINTMENT (OUTPATIENT)
Dept: ORTHOPEDIC SURGERY | Facility: CLINIC | Age: 74
End: 2024-06-14
Payer: MEDICARE

## 2024-06-14 DIAGNOSIS — S62.524A NONDISPLACED FRACTURE OF DISTAL PHALANX OF RIGHT THUMB, INITIAL ENCOUNTER FOR CLOSED FRACTURE: ICD-10-CM

## 2024-06-14 PROCEDURE — 29130 APPL FINGER SPLINT STATIC: CPT | Mod: RT

## 2024-06-14 PROCEDURE — 73140 X-RAY EXAM OF FINGER(S): CPT | Mod: RT

## 2024-06-14 PROCEDURE — 99204 OFFICE O/P NEW MOD 45 MIN: CPT | Mod: 25

## 2024-06-14 NOTE — ASSESSMENT
[FreeTextEntry1] : Patient comes in status post right thumb proximal phalanx fracture.  She has been changing her splint.  She said she had a fall when [place] a [place].  She fell directly onto her thumb.  She is doing okay otherwise.  She does not have other complaints.  Right thumb: Small subungual hematoma, tender palpation along the distal phalanx, decreased range of motion of the thumb, neurovascular intact  X-rays of the right thumb 3 views show mild displacement and of the distal phalanx fracture with a congruent joint  Status post right thumb distal phalanx fracture.  Discussed with the patient she should be wearing the splint at all times.  If she needs to changes when she should putting her finger on a flat surface and then changing the splint.  She also said that she started to get numbness at the tip of the finger.  While she does have carpal tunnel I believe that she is probably using the Coban and making it too tight.  We discussed how to wrap it and how to minimize the amount of pressure.  The patient understood.  I placed her into a new splint and I made her to splints so that she can change them if needed.  She will wear the splint is much as possible.  She will follow-up in 2 weeks for new films.

## 2024-06-16 ENCOUNTER — RX RENEWAL (OUTPATIENT)
Age: 74
End: 2024-06-16

## 2024-06-16 RX ORDER — FLUTICASONE FUROATE AND VILANTEROL TRIFENATATE 100; 25 UG/1; UG/1
100-25 POWDER RESPIRATORY (INHALATION)
Qty: 60 | Refills: 0 | Status: ACTIVE | COMMUNITY
Start: 2021-07-22 | End: 1900-01-01

## 2024-07-02 ENCOUNTER — APPOINTMENT (OUTPATIENT)
Dept: ORTHOPEDIC SURGERY | Facility: CLINIC | Age: 74
End: 2024-07-02

## 2024-07-02 ENCOUNTER — RESULT CHARGE (OUTPATIENT)
Age: 74
End: 2024-07-02

## 2024-07-02 DIAGNOSIS — S62.524A NONDISPLACED FRACTURE OF DISTAL PHALANX OF RIGHT THUMB, INITIAL ENCOUNTER FOR CLOSED FRACTURE: ICD-10-CM

## 2024-07-02 PROCEDURE — 99213 OFFICE O/P EST LOW 20 MIN: CPT | Mod: 25

## 2024-07-02 PROCEDURE — 29130 APPL FINGER SPLINT STATIC: CPT | Mod: RT

## 2024-07-02 PROCEDURE — 73140 X-RAY EXAM OF FINGER(S): CPT | Mod: RT

## 2024-08-13 ENCOUNTER — APPOINTMENT (OUTPATIENT)
Dept: ORTHOPEDIC SURGERY | Facility: CLINIC | Age: 74
End: 2024-08-13
Payer: MEDICARE

## 2024-08-13 DIAGNOSIS — S62.524A NONDISPLACED FRACTURE OF DISTAL PHALANX OF RIGHT THUMB, INITIAL ENCOUNTER FOR CLOSED FRACTURE: ICD-10-CM

## 2024-08-13 PROCEDURE — 99213 OFFICE O/P EST LOW 20 MIN: CPT

## 2024-08-13 PROCEDURE — 73140 X-RAY EXAM OF FINGER(S): CPT | Mod: RT

## 2024-08-14 NOTE — ASSESSMENT
[FreeTextEntry1] : Patient comes in status post right thumb distal phalanx fracture.  She is doing well.  She has been working on range of motion.  She says she is doing a lot better than prior.  Right thumb: Small subungual hematoma, non tender palpation along the distal phalanx, improved range of motion of the thumb, neurovascular intact  X-rays of the right thumb 3 views show mild displacement and of the distal phalanx fracture with a congruent joint  Status post right thumb distal phalanx fracture.  Patient is doing well.  She does not need to use splinting anymore.  She is getting continue work on range of motion.  She is healed at this point.  She will follow-up as needed.

## 2024-08-22 ENCOUNTER — RX RENEWAL (OUTPATIENT)
Age: 74
End: 2024-08-22

## 2024-08-30 ENCOUNTER — OUTPATIENT (OUTPATIENT)
Dept: OUTPATIENT SERVICES | Facility: HOSPITAL | Age: 74
LOS: 1 days | End: 2024-08-30
Payer: MEDICARE

## 2024-08-30 ENCOUNTER — APPOINTMENT (OUTPATIENT)
Age: 74
End: 2024-08-30

## 2024-08-30 ENCOUNTER — LABORATORY RESULT (OUTPATIENT)
Age: 74
End: 2024-08-30

## 2024-08-30 VITALS
DIASTOLIC BLOOD PRESSURE: 67 MMHG | BODY MASS INDEX: 28.57 KG/M2 | TEMPERATURE: 98 F | RESPIRATION RATE: 17 BRPM | SYSTOLIC BLOOD PRESSURE: 102 MMHG | HEIGHT: 66 IN | OXYGEN SATURATION: 99 % | HEART RATE: 58 BPM | WEIGHT: 177.8 LBS

## 2024-08-30 DIAGNOSIS — D64.9 ANEMIA, UNSPECIFIED: ICD-10-CM

## 2024-08-30 DIAGNOSIS — D47.2 MONOCLONAL GAMMOPATHY: ICD-10-CM

## 2024-08-30 DIAGNOSIS — Z98.890 OTHER SPECIFIED POSTPROCEDURAL STATES: Chronic | ICD-10-CM

## 2024-08-30 DIAGNOSIS — Z90.710 ACQUIRED ABSENCE OF BOTH CERVIX AND UTERUS: Chronic | ICD-10-CM

## 2024-08-30 LAB
HCT VFR BLD CALC: 36.4 %
HGB BLD-MCNC: 11.6 G/DL
MCHC RBC-ENTMCNC: 28.3 PG
MCHC RBC-ENTMCNC: 31.9 G/DL
MCV RBC AUTO: 88.8 FL
PLATELET # BLD AUTO: 262 K/UL
PMV BLD: 9.2 FL
RBC # BLD: 4.1 M/UL
RBC # FLD: 14.5 %
WBC # FLD AUTO: 6.04 K/UL

## 2024-08-30 PROCEDURE — 84466 ASSAY OF TRANSFERRIN: CPT

## 2024-08-30 PROCEDURE — 86334 IMMUNOFIX E-PHORESIS SERUM: CPT

## 2024-08-30 PROCEDURE — 83540 ASSAY OF IRON: CPT

## 2024-08-30 PROCEDURE — 82728 ASSAY OF FERRITIN: CPT

## 2024-08-30 PROCEDURE — 84155 ASSAY OF PROTEIN SERUM: CPT

## 2024-08-30 PROCEDURE — 84165 PROTEIN E-PHORESIS SERUM: CPT

## 2024-08-30 PROCEDURE — 83550 IRON BINDING TEST: CPT

## 2024-08-30 PROCEDURE — 83521 IG LIGHT CHAINS FREE EACH: CPT | Mod: 91

## 2024-08-30 PROCEDURE — 99214 OFFICE O/P EST MOD 30 MIN: CPT

## 2024-08-30 PROCEDURE — 82607 VITAMIN B-12: CPT

## 2024-08-30 PROCEDURE — 80053 COMPREHEN METABOLIC PANEL: CPT

## 2024-08-30 PROCEDURE — 85027 COMPLETE CBC AUTOMATED: CPT

## 2024-08-30 PROCEDURE — 82746 ASSAY OF FOLIC ACID SERUM: CPT

## 2024-08-30 PROCEDURE — 82784 ASSAY IGA/IGD/IGG/IGM EACH: CPT

## 2024-08-31 DIAGNOSIS — D64.9 ANEMIA, UNSPECIFIED: ICD-10-CM

## 2024-09-03 LAB
ALBUMIN SERPL ELPH-MCNC: 4.6 G/DL
ALP BLD-CCNC: 80 U/L
ALT SERPL-CCNC: 20 U/L
ANION GAP SERPL CALC-SCNC: 8 MMOL/L
AST SERPL-CCNC: 18 U/L
BILIRUB SERPL-MCNC: 0.2 MG/DL
BUN SERPL-MCNC: 15 MG/DL
CALCIUM SERPL-MCNC: 9.9 MG/DL
CHLORIDE SERPL-SCNC: 105 MMOL/L
CO2 SERPL-SCNC: 26 MMOL/L
CREAT SERPL-MCNC: 0.7 MG/DL
EGFR: 90 ML/MIN/1.73M2
FERRITIN SERPL-MCNC: 72 NG/ML
FOLATE SERPL-MCNC: >20 NG/ML
GLUCOSE SERPL-MCNC: 83 MG/DL
IGA SER QL IEP: 68 MG/DL
IGM SER QL IEP: 67 MG/DL
IRON SATN MFR SERPL: 25 %
IRON SERPL-MCNC: 73 UG/DL
POTASSIUM SERPL-SCNC: 5.2 MMOL/L
PROT SERPL-MCNC: 7 G/DL
SODIUM SERPL-SCNC: 139 MMOL/L
TIBC SERPL-MCNC: 288 UG/DL
TRANSFERRIN SERPL-MCNC: 267 MG/DL
UIBC SERPL-MCNC: 215 UG/DL
VIT B12 SERPL-MCNC: 1726 PG/ML

## 2024-09-03 NOTE — HISTORY OF PRESENT ILLNESS
[de-identified] : This is a 67 y/o white female who is here today for a follow up visit for MGUS.  In August 2017, she was sent in for initial consultation for monoclonal gammopathy by her primary care physician.  Patient was found to have mild anemia on routine CBC on 2 occasions(11.8 g/dL on 8/8/17). As a result, her PCP performed further w/u which showed an M-spike of 0.6 g/dL (IgG Kappa band). Her ferritin was 62 ng/mL, TIBC 359, Iron 54, folate >20 and B12 488.  Pt's history is also significant for gastritis and GERD. She also takes NSAIDs for her OA. Last EGD was 5 years ago, which showed gastritis.    [de-identified] : She is here for follow up. Pt recently saw her ophthalmologist and has an abnormality which she is unable to describe. Her vision is normal at this time.  5/13/19: Dxed with enteritis in Jan 2019.  Last colonoscopy 6-7yrs ago. Scheduled to see GI today. Denies any complaints currently.  Denies fever, nausea, vomiting, chest pain, SOB, abdominal pain, bowel and bladder problems. Uptodate with mammo.   12/16/19 Pt is here for follow up of MGUS. Denies fever, nausea, vomiting, chest pain, SOB, abdominal pain, bowel and bladder problems. Uptodate with mammo ( 12/2/19) Reviewed.  pt has been getting cardio w/u for bradycardia. C/o fatigue  6/15/20 Pt is here for follow up visit. No new complaints. Denies bone pain Denies fever, nausea, vomiting, chest pain, SOB, abdominal pain, bowel and bladder problems.  12/28/2020 ZULEYMA NAVA a 70 year F is here today for follow up for MGUS.  She offers no new complaints.  Denies fever, chills, night sweats, weight loss. Reports lung nodules, follows Dr Dong. Has annual Ct scan, stable, as per patient nonmalignant.  Has mammogram appt this week for annual screening.  Has [place] esophagus, repeat in 3 months.   6/21/21 Pt is here for follow up. Has been having back pain d/t to prolapsed discs and arthritis. Also saw her derm and had skin biopsy on her Rt leg. Follows with GI for Barretts esophagus  1/17/22 Pt is here for follow up. No bone pain. Has CHOPRA, being investigated by pulm and cardio. Going for CT chest has h/o lung nodules. H/O heavy smoking. UTD with GI and GYN  9/12/22 Pt is here for follow up. Feels well. No bone pain Follows with Pulm 3/27/23 Pt is here for follow up. Was diagnosis with lung nodule. rpt CT chest is ordered.  08/30/24: Pt is here for follow up MGUS and Mild anemia.  She reports feeling well, No fever, night sweats or bone pain.  She had Colonoscopy last week, it is normal. She denied vaginal and rectal bleeding.  Follows with Pulmonology for lung Nodule.

## 2024-09-03 NOTE — HISTORY OF PRESENT ILLNESS
[de-identified] : This is a 67 y/o white female who is here today for a follow up visit for MGUS.  In August 2017, she was sent in for initial consultation for monoclonal gammopathy by her primary care physician.  Patient was found to have mild anemia on routine CBC on 2 occasions(11.8 g/dL on 8/8/17). As a result, her PCP performed further w/u which showed an M-spike of 0.6 g/dL (IgG Kappa band). Her ferritin was 62 ng/mL, TIBC 359, Iron 54, folate >20 and B12 488.  Pt's history is also significant for gastritis and GERD. She also takes NSAIDs for her OA. Last EGD was 5 years ago, which showed gastritis.    [de-identified] : She is here for follow up. Pt recently saw her ophthalmologist and has an abnormality which she is unable to describe. Her vision is normal at this time.  5/13/19: Dxed with enteritis in Jan 2019.  Last colonoscopy 6-7yrs ago. Scheduled to see GI today. Denies any complaints currently.  Denies fever, nausea, vomiting, chest pain, SOB, abdominal pain, bowel and bladder problems. Uptodate with mammo.   12/16/19 Pt is here for follow up of MGUS. Denies fever, nausea, vomiting, chest pain, SOB, abdominal pain, bowel and bladder problems. Uptodate with mammo ( 12/2/19) Reviewed.  pt has been getting cardio w/u for bradycardia. C/o fatigue  6/15/20 Pt is here for follow up visit. No new complaints. Denies bone pain Denies fever, nausea, vomiting, chest pain, SOB, abdominal pain, bowel and bladder problems.  12/28/2020 ZULEYMA NAVA a 70 year F is here today for follow up for MGUS.  She offers no new complaints.  Denies fever, chills, night sweats, weight loss. Reports lung nodules, follows Dr Dong. Has annual Ct scan, stable, as per patient nonmalignant.  Has mammogram appt this week for annual screening.  Has [place] esophagus, repeat in 3 months.   6/21/21 Pt is here for follow up. Has been having back pain d/t to prolapsed discs and arthritis. Also saw her derm and had skin biopsy on her Rt leg. Follows with GI for Barretts esophagus  1/17/22 Pt is here for follow up. No bone pain. Has CHOPRA, being investigated by pulm and cardio. Going for CT chest has h/o lung nodules. H/O heavy smoking. UTD with GI and GYN  9/12/22 Pt is here for follow up. Feels well. No bone pain Follows with Pulm 3/27/23 Pt is here for follow up. Was diagnosis with lung nodule. rpt CT chest is ordered.  08/30/24: Pt is here for follow up MGUS and Mild anemia.  She reports feeling well, No fever, night sweats or bone pain.  She had Colonoscopy last week, it is normal. She denied vaginal and rectal bleeding.  Follows with Pulmonology for lung Nodule.

## 2024-09-03 NOTE — ASSESSMENT
[FreeTextEntry1] : 69 y/o white female who is here today for a follow up visit for MGUS  IGG kappa MGUS.   History of Iron deficiency Anemia, Vitamin B12   PLAN: Oeder CBC, MMP and Ferritin level and B12 level.  Last myeloma panel has been stable, Blood work ordered: UTD with colonoscopy. Up to date with B/L Breast Mammogram.  Follow up with Pulmonary for Lung Nodule, stable.    RTC in 6 months.

## 2024-09-04 ENCOUNTER — APPOINTMENT (OUTPATIENT)
Age: 74
End: 2024-09-04

## 2024-09-04 LAB
ALBUMIN MFR SERPL ELPH: 58.6 %
ALBUMIN SERPL-MCNC: 4 G/DL
ALBUMIN/GLOB SERPL: 1.4 RATIO
ALPHA1 GLOB MFR SERPL ELPH: 5.2 %
ALPHA1 GLOB SERPL ELPH-MCNC: 0.4 G/DL
ALPHA2 GLOB MFR SERPL ELPH: 11.3 %
ALPHA2 GLOB SERPL ELPH-MCNC: 0.8 G/DL
B-GLOBULIN MFR SERPL ELPH: 9.9 %
B-GLOBULIN SERPL ELPH-MCNC: 0.7 G/DL
DEPRECATED KAPPA LC FREE/LAMBDA SER: 1.98 RATIO
GAMMA GLOB FLD ELPH-MCNC: 1 G/DL
GAMMA GLOB MFR SERPL ELPH: 15 %
IGA SER QL IEP: 68 MG/DL
IGG SER QL IEP: 1078 MG/DL
IGM SER QL IEP: 67 MG/DL
INTERPRETATION SERPL IEP-IMP: NORMAL
KAPPA LC CSF-MCNC: 0.55 MG/DL
KAPPA LC SERPL-MCNC: 1.09 MG/DL
M PROTEIN MFR SERPL ELPH: 9.7 %
M PROTEIN SPEC IFE-MCNC: NORMAL
MONOCLON BAND OBS SERPL: 0.7 G/DL
PROT SERPL-MCNC: 6.9 G/DL
PROT SERPL-MCNC: 6.9 G/DL

## 2024-11-13 ENCOUNTER — APPOINTMENT (OUTPATIENT)
Dept: ORTHOPEDIC SURGERY | Facility: CLINIC | Age: 74
End: 2024-11-13
Payer: MEDICARE

## 2024-11-13 DIAGNOSIS — M65.332 TRIGGER FINGER, LEFT MIDDLE FINGER: ICD-10-CM

## 2024-11-13 PROCEDURE — 99214 OFFICE O/P EST MOD 30 MIN: CPT

## 2024-12-11 ENCOUNTER — APPOINTMENT (OUTPATIENT)
Dept: PULMONOLOGY | Facility: CLINIC | Age: 74
End: 2024-12-11

## 2024-12-11 VITALS
WEIGHT: 170 LBS | HEIGHT: 67 IN | RESPIRATION RATE: 15 BRPM | SYSTOLIC BLOOD PRESSURE: 130 MMHG | BODY MASS INDEX: 26.68 KG/M2 | HEART RATE: 84 BPM | DIASTOLIC BLOOD PRESSURE: 70 MMHG | OXYGEN SATURATION: 97 %

## 2024-12-11 DIAGNOSIS — J44.9 CHRONIC OBSTRUCTIVE PULMONARY DISEASE, UNSPECIFIED: ICD-10-CM

## 2024-12-11 DIAGNOSIS — J45.909 UNSPECIFIED ASTHMA, UNCOMPLICATED: ICD-10-CM

## 2024-12-11 PROCEDURE — G2211 COMPLEX E/M VISIT ADD ON: CPT

## 2024-12-11 PROCEDURE — 94729 DIFFUSING CAPACITY: CPT

## 2024-12-11 PROCEDURE — 94010 BREATHING CAPACITY TEST: CPT

## 2024-12-11 PROCEDURE — 94727 GAS DIL/WSHOT DETER LNG VOL: CPT

## 2024-12-11 PROCEDURE — 99213 OFFICE O/P EST LOW 20 MIN: CPT | Mod: 25

## 2024-12-11 RX ORDER — FLUTICASONE FUROATE AND VILANTEROL TRIFENATATE 100; 25 UG/1; UG/1
100-25 POWDER RESPIRATORY (INHALATION)
Qty: 60 | Refills: 5 | Status: ACTIVE | COMMUNITY
Start: 2024-12-11 | End: 1900-01-01

## 2024-12-24 PROBLEM — F10.90 ALCOHOL USE: Status: ACTIVE | Noted: 2017-08-21

## 2025-01-03 ENCOUNTER — OUTPATIENT (OUTPATIENT)
Dept: OUTPATIENT SERVICES | Facility: HOSPITAL | Age: 75
LOS: 1 days | End: 2025-01-03
Payer: MEDICARE

## 2025-01-03 VITALS
DIASTOLIC BLOOD PRESSURE: 81 MMHG | HEIGHT: 67 IN | HEART RATE: 61 BPM | TEMPERATURE: 97 F | OXYGEN SATURATION: 97 % | RESPIRATION RATE: 17 BRPM | SYSTOLIC BLOOD PRESSURE: 134 MMHG | WEIGHT: 169.98 LBS

## 2025-01-03 DIAGNOSIS — Z01.818 ENCOUNTER FOR OTHER PREPROCEDURAL EXAMINATION: ICD-10-CM

## 2025-01-03 DIAGNOSIS — Z98.890 OTHER SPECIFIED POSTPROCEDURAL STATES: Chronic | ICD-10-CM

## 2025-01-03 DIAGNOSIS — Z90.710 ACQUIRED ABSENCE OF BOTH CERVIX AND UTERUS: Chronic | ICD-10-CM

## 2025-01-03 DIAGNOSIS — Z96.642 PRESENCE OF LEFT ARTIFICIAL HIP JOINT: Chronic | ICD-10-CM

## 2025-01-03 DIAGNOSIS — Z96.641 PRESENCE OF RIGHT ARTIFICIAL HIP JOINT: Chronic | ICD-10-CM

## 2025-01-03 DIAGNOSIS — M65.332 TRIGGER FINGER, LEFT MIDDLE FINGER: ICD-10-CM

## 2025-01-03 LAB
ALBUMIN SERPL ELPH-MCNC: 4.5 G/DL — SIGNIFICANT CHANGE UP (ref 3.5–5.2)
ALP SERPL-CCNC: 71 U/L — SIGNIFICANT CHANGE UP (ref 30–115)
ALT FLD-CCNC: 16 U/L — SIGNIFICANT CHANGE UP (ref 0–41)
ANION GAP SERPL CALC-SCNC: 12 MMOL/L — SIGNIFICANT CHANGE UP (ref 7–14)
AST SERPL-CCNC: 18 U/L — SIGNIFICANT CHANGE UP (ref 0–41)
BASOPHILS # BLD AUTO: 0.04 K/UL — SIGNIFICANT CHANGE UP (ref 0–0.2)
BASOPHILS NFR BLD AUTO: 0.7 % — SIGNIFICANT CHANGE UP (ref 0–1)
BILIRUB SERPL-MCNC: <0.2 MG/DL — SIGNIFICANT CHANGE UP (ref 0.2–1.2)
BUN SERPL-MCNC: 12 MG/DL — SIGNIFICANT CHANGE UP (ref 10–20)
CALCIUM SERPL-MCNC: 10.3 MG/DL — SIGNIFICANT CHANGE UP (ref 8.4–10.5)
CHLORIDE SERPL-SCNC: 104 MMOL/L — SIGNIFICANT CHANGE UP (ref 98–110)
CO2 SERPL-SCNC: 24 MMOL/L — SIGNIFICANT CHANGE UP (ref 17–32)
CREAT SERPL-MCNC: 0.7 MG/DL — SIGNIFICANT CHANGE UP (ref 0.7–1.5)
EGFR: 90 ML/MIN/1.73M2 — SIGNIFICANT CHANGE UP
EOSINOPHIL # BLD AUTO: 0.12 K/UL — SIGNIFICANT CHANGE UP (ref 0–0.7)
EOSINOPHIL NFR BLD AUTO: 2 % — SIGNIFICANT CHANGE UP (ref 0–8)
GLUCOSE SERPL-MCNC: 100 MG/DL — HIGH (ref 70–99)
HCT VFR BLD CALC: 37 % — SIGNIFICANT CHANGE UP (ref 37–47)
HGB BLD-MCNC: 12 G/DL — SIGNIFICANT CHANGE UP (ref 12–16)
IMM GRANULOCYTES NFR BLD AUTO: 0.5 % — HIGH (ref 0.1–0.3)
LYMPHOCYTES # BLD AUTO: 1.24 K/UL — SIGNIFICANT CHANGE UP (ref 1.2–3.4)
LYMPHOCYTES # BLD AUTO: 20.9 % — SIGNIFICANT CHANGE UP (ref 20.5–51.1)
MCHC RBC-ENTMCNC: 29.1 PG — SIGNIFICANT CHANGE UP (ref 27–31)
MCHC RBC-ENTMCNC: 32.4 G/DL — SIGNIFICANT CHANGE UP (ref 32–37)
MCV RBC AUTO: 89.8 FL — SIGNIFICANT CHANGE UP (ref 81–99)
MONOCYTES # BLD AUTO: 0.5 K/UL — SIGNIFICANT CHANGE UP (ref 0.1–0.6)
MONOCYTES NFR BLD AUTO: 8.4 % — SIGNIFICANT CHANGE UP (ref 1.7–9.3)
NEUTROPHILS # BLD AUTO: 4.01 K/UL — SIGNIFICANT CHANGE UP (ref 1.4–6.5)
NEUTROPHILS NFR BLD AUTO: 67.5 % — SIGNIFICANT CHANGE UP (ref 42.2–75.2)
NRBC # BLD: 0 /100 WBCS — SIGNIFICANT CHANGE UP (ref 0–0)
PLATELET # BLD AUTO: 276 K/UL — SIGNIFICANT CHANGE UP (ref 130–400)
PMV BLD: 9.9 FL — SIGNIFICANT CHANGE UP (ref 7.4–10.4)
POTASSIUM SERPL-MCNC: 4.7 MMOL/L — SIGNIFICANT CHANGE UP (ref 3.5–5)
POTASSIUM SERPL-SCNC: 4.7 MMOL/L — SIGNIFICANT CHANGE UP (ref 3.5–5)
PROT SERPL-MCNC: 6.8 G/DL — SIGNIFICANT CHANGE UP (ref 6–8)
RBC # BLD: 4.12 M/UL — LOW (ref 4.2–5.4)
RBC # FLD: 14.3 % — SIGNIFICANT CHANGE UP (ref 11.5–14.5)
SODIUM SERPL-SCNC: 140 MMOL/L — SIGNIFICANT CHANGE UP (ref 135–146)
WBC # BLD: 5.94 K/UL — SIGNIFICANT CHANGE UP (ref 4.8–10.8)
WBC # FLD AUTO: 5.94 K/UL — SIGNIFICANT CHANGE UP (ref 4.8–10.8)

## 2025-01-03 PROCEDURE — 36415 COLL VENOUS BLD VENIPUNCTURE: CPT

## 2025-01-03 PROCEDURE — 99214 OFFICE O/P EST MOD 30 MIN: CPT | Mod: 25

## 2025-01-03 PROCEDURE — 85025 COMPLETE CBC W/AUTO DIFF WBC: CPT

## 2025-01-03 PROCEDURE — 93005 ELECTROCARDIOGRAM TRACING: CPT

## 2025-01-03 PROCEDURE — 80053 COMPREHEN METABOLIC PANEL: CPT

## 2025-01-03 PROCEDURE — 93010 ELECTROCARDIOGRAM REPORT: CPT

## 2025-01-03 NOTE — H&P PST ADULT - NSICDXFAMILYHX_GEN_ALL_CORE_FT
FAMILY HISTORY:  Father  Still living? Unknown  FH: lung cancer, Age at diagnosis: Age Unknown    Mother  Still living? No  FH: lung cancer, Age at diagnosis: Age Unknown

## 2025-01-03 NOTE — H&P PST ADULT - HISTORY OF PRESENT ILLNESS
PATIENT DENIES CHEST PAIN, SHORTNESS OF BREATH, PALPITATIONS, COUGHING, FEVER, DYSURIA.    NO COUGH, FEVER, SORE THROAT, HEADACHE, LOSS OF TASTE OR SMELL. NO KNOWN EXPOSURE TO ANYONE WITH COVID. PATIENT WAS INSTRUCTED TO ISOLATE FROM NOW UNTIL THE SURGERY.    Anesthesia Alert  NO--Difficult Airway  NO--History of neck surgery or radiation  NO--Limited ROM of neck  NO--History of Malignant hyperthermia  NO--Personal or family history of Pseudocholinesterase deficiency  NO--Prior Anesthesia Complication  NO--Latex Allergy  NO--Loose teeth  NO--History of Rheumatoid Arthritis  NO--EVANGELINA  NO--bleeding risk    RCRI=1  DASI=5.72 METS

## 2025-01-03 NOTE — H&P PST ADULT - MUSCULOSKELETAL
left middle finger. + trigger noted/ROM intact/decreased ROM/normal gait/strength 5/5 bilateral upper extremities/strength 5/5 bilateral lower extremities

## 2025-01-03 NOTE — H&P PST ADULT - NSICDXPASTMEDICALHX_GEN_ALL_CORE_FT
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

## 2025-01-03 NOTE — H&P PST ADULT - NSICDXPASTSURGICALHX_GEN_ALL_CORE_FT
PAST SURGICAL HISTORY:  H/O eye surgery     H/O umbilical hernia repair     History of arthroscopy of left knee     History of hysterectomy     History of laparoscopic appendectomy     History of ovarian cystectomy     S/P carpal tunnel release     S/P hip replacement, left     S/P hip replacement, right

## 2025-01-03 NOTE — H&P PST ADULT - REASON FOR ADMISSION
73 Y/O FEMALE HERE FOR PRE-ADMISSION SURGICAL TESTING. PATIENT REPORTS LEFT MIDDLE GETTING "STUCK" X1 YR. PAIN IS 5/10, SHARP. SHE HAS TO WRAP IT AT NIGHT. SHE WENT TO ORTHO, AND WAS DIAGNOSED WITH TRIGGER FINGER TO LEFT MIDDLE FINGER. SHE JUST HAD A CARPAL TUNNEL RELEASE TO HER RIGHT HAND 8/24 AND DID WELL.  NOW FOR SCHEDULED LEFT HAND THIRD DIGIT TRIGGER RELEASE. 73 Y/O FEMALE HERE FOR PRE-ADMISSION SURGICAL TESTING. H/O COPD, PULM NODULE, DVT LLE 30 YRS AGO. MGUS, ANEMIA, HTN, HLD AND GERD. PATIENT REPORTS LEFT MIDDLE GETTING "STUCK" X1 YR. PAIN IS 5/10, SHARP. SHE HAS TO WRAP IT AT NIGHT. SHE WENT TO ORTHO, AND WAS DIAGNOSED WITH TRIGGER FINGER TO LEFT MIDDLE FINGER. SHE JUST HAD A CARPAL TUNNEL RELEASE TO HER RIGHT HAND 8/24 AND DID WELL.  NOW FOR SCHEDULED LEFT HAND THIRD DIGIT TRIGGER RELEASE.

## 2025-01-04 DIAGNOSIS — Z01.818 ENCOUNTER FOR OTHER PREPROCEDURAL EXAMINATION: ICD-10-CM

## 2025-01-04 DIAGNOSIS — M65.332 TRIGGER FINGER, LEFT MIDDLE FINGER: ICD-10-CM

## 2025-01-09 ENCOUNTER — APPOINTMENT (OUTPATIENT)
Facility: CLINIC | Age: 75
End: 2025-01-09

## 2025-01-15 NOTE — ASU PATIENT PROFILE, ADULT - NSICDXPASTSURGICALHX_GEN_ALL_CORE_FT
PAST SURGICAL HISTORY:  H/O  section     H/O eye surgery cataract (B/L IOL), cyst removal on b/l corneas, eye lasers.    H/O umbilical hernia repair     History of arthroscopy of left knee torn meniscus    History of hysterectomy     History of laparoscopic appendectomy     History of ovarian cystectomy     S/P carpal tunnel release     S/P hip replacement, left     S/P hip replacement, right

## 2025-01-16 ENCOUNTER — RESULT REVIEW (OUTPATIENT)
Age: 75
End: 2025-01-16

## 2025-01-16 ENCOUNTER — APPOINTMENT (OUTPATIENT)
Dept: ORTHOPEDIC SURGERY | Facility: AMBULATORY SURGERY CENTER | Age: 75
End: 2025-01-16

## 2025-01-16 ENCOUNTER — TRANSCRIPTION ENCOUNTER (OUTPATIENT)
Age: 75
End: 2025-01-16

## 2025-01-16 ENCOUNTER — OUTPATIENT (OUTPATIENT)
Dept: OUTPATIENT SERVICES | Facility: HOSPITAL | Age: 75
LOS: 1 days | Discharge: HOME | End: 2025-01-16
Payer: MEDICARE

## 2025-01-16 VITALS
HEART RATE: 65 BPM | OXYGEN SATURATION: 96 % | TEMPERATURE: 98 F | RESPIRATION RATE: 18 BRPM | WEIGHT: 169.98 LBS | DIASTOLIC BLOOD PRESSURE: 56 MMHG | SYSTOLIC BLOOD PRESSURE: 142 MMHG | HEIGHT: 67 IN

## 2025-01-16 VITALS
DIASTOLIC BLOOD PRESSURE: 72 MMHG | HEART RATE: 57 BPM | RESPIRATION RATE: 20 BRPM | TEMPERATURE: 98 F | SYSTOLIC BLOOD PRESSURE: 150 MMHG | OXYGEN SATURATION: 99 %

## 2025-01-16 DIAGNOSIS — M65.332 TRIGGER FINGER, LEFT MIDDLE FINGER: ICD-10-CM

## 2025-01-16 DIAGNOSIS — Z96.642 PRESENCE OF LEFT ARTIFICIAL HIP JOINT: Chronic | ICD-10-CM

## 2025-01-16 DIAGNOSIS — Z98.891 HISTORY OF UTERINE SCAR FROM PREVIOUS SURGERY: Chronic | ICD-10-CM

## 2025-01-16 DIAGNOSIS — Z96.641 PRESENCE OF RIGHT ARTIFICIAL HIP JOINT: Chronic | ICD-10-CM

## 2025-01-16 DIAGNOSIS — Z98.890 OTHER SPECIFIED POSTPROCEDURAL STATES: Chronic | ICD-10-CM

## 2025-01-16 DIAGNOSIS — Z90.710 ACQUIRED ABSENCE OF BOTH CERVIX AND UTERUS: Chronic | ICD-10-CM

## 2025-01-16 PROCEDURE — 88304 TISSUE EXAM BY PATHOLOGIST: CPT | Mod: 26

## 2025-01-16 PROCEDURE — 26055 INCISE FINGER TENDON SHEATH: CPT | Mod: F2

## 2025-01-16 PROCEDURE — 88304 TISSUE EXAM BY PATHOLOGIST: CPT

## 2025-01-16 RX ORDER — HYDROMORPHONE HCL 4 MG
0.5 TABLET ORAL
Refills: 0 | Status: DISCONTINUED | OUTPATIENT
Start: 2025-01-16 | End: 2025-01-16

## 2025-01-16 RX ORDER — IBUPROFEN 200 MG
1 TABLET ORAL
Refills: 0 | DISCHARGE

## 2025-01-16 RX ORDER — FAMOTIDINE 20 MG/1
1 TABLET, FILM COATED ORAL
Refills: 0 | DISCHARGE

## 2025-01-16 RX ORDER — ACETAMINOPHEN 80 MG/.8ML
0 SOLUTION/ DROPS ORAL
Refills: 0 | DISCHARGE

## 2025-01-16 RX ORDER — ONDANSETRON 4 MG/1
4 TABLET ORAL ONCE
Refills: 0 | Status: DISCONTINUED | OUTPATIENT
Start: 2025-01-16 | End: 2025-01-16

## 2025-01-16 RX ORDER — TRAMADOL HYDROCHLORIDE 50 MG/1
1 TABLET ORAL
Refills: 0 | DISCHARGE

## 2025-01-16 RX ORDER — SODIUM CHLORIDE 9 MG/ML
1000 INJECTION, SOLUTION INTRAVENOUS
Refills: 0 | Status: DISCONTINUED | OUTPATIENT
Start: 2025-01-16 | End: 2025-01-16

## 2025-01-16 RX ORDER — OXYCODONE HCL 15 MG
5 TABLET ORAL ONCE
Refills: 0 | Status: DISCONTINUED | OUTPATIENT
Start: 2025-01-16 | End: 2025-01-16

## 2025-01-16 RX ORDER — ROSUVASTATIN 40 MG/1
1 TABLET, FILM COATED ORAL
Refills: 0 | DISCHARGE

## 2025-01-16 RX ORDER — BENAZEPRIL HYDROCHLORIDE 10 MG/1
1 TABLET ORAL
Refills: 0 | DISCHARGE

## 2025-01-16 RX ORDER — ACETAMINOPHEN 80 MG/.8ML
1000 SOLUTION/ DROPS ORAL ONCE
Refills: 0 | Status: DISCONTINUED | OUTPATIENT
Start: 2025-01-16 | End: 2025-01-16

## 2025-01-16 NOTE — ASU DISCHARGE PLAN (ADULT/PEDIATRIC) - CARE PROVIDER_API CALL
Brigida Braun [facility]  Orthopaedic Surgery  [address] [place] [address]  Phone: [phone]  Fax: [phone]  Follow Up Time:

## 2025-01-16 NOTE — ASU DISCHARGE PLAN (ADULT/PEDIATRIC) - FINANCIAL ASSISTANCE
[facility] provides services at a reduced cost to those who are determined to be eligible through [facility]’s financial assistance program. Information regarding [facility]’s financial assistance program can be found by going to [email or web address] or by calling [phone].

## 2025-01-16 NOTE — ASU PREOP CHECKLIST - AS TEMP SITE
Please call Dr. Lees' s office within next few days to schedule a follow up appointment about 14 days after surgery.   Recommend follow up with medical MD, within next 4 weeks.   Dressing should be removed per date on dressing.   Patient may shower, limit direct water to dressing, if wet pat dry.   Out of bed, ambulate, weight bearing as tolerated-   Physical therapy to assist with exercise and help increase endurance.   Please contact Doctors office regarding arrangements for out patient Physical therapy.  oral Please follow up with Dr. Lees at your pre-scheduled post-operative follow up appointment. (Call to confirm)  Dressing care:   -Keep dressing clean, dry, and intact. Do not remove bandage until date written on bandage.   Continue to ambulate as tolerated to the operative leg.     Pain medications:   Standing:         -Acetaminophen 500mg - 2 tabs every 8 hours  As needed:        -Tramadol 50mg - 1 tab every 6 hours - Take only if needed for MODERATE pain       -oxycodone 5mg - 1 tab every 4-6 hours - Take only if needed for SEVERE or BREAKTHROUGH pain    Other Medications: (Standing)  -Aspirin (Enteric Coated) 81mg every 12 hours - to prevent blood clots (for 4 weeks post operatively.)  -Protonix 40mg - 1 tab every 24 hours - to prevent stomach irritation/ulcers  -Senna 8.6mg - 2 pills every 24 hours - stool softener  -colace 100mg - 1 pill every 8 hours - stool softener.     Recommended to follow up with your primary care provider within 1-2 months of hospital discharge to discuss your recent surgery and any change to your medications.  Please follow up with Dr. Lees at your pre-scheduled post-operative follow up appointment. (Call to confirm)  Dressing care:   -Keep dressing clean, dry, and intact. Do not remove bandage until date written on bandage.   Continue to ambulate as tolerated to the operative leg.     Pain medications:   Standing:         -Acetaminophen 500mg - 2 tabs every 8 hours  As needed:        -Tramadol 50mg - 1 tab every 6 hours - Take only if needed for Mild pain       -oxycodone 5mg - Half to 1 tab every 4 hours as needed for moderate to severe pain    Other Medications: (Standing)  -Aspirin (Enteric Coated) 81mg every 12 hours - to prevent blood clots (for 4 weeks post operatively.)  -Protonix 40mg - 1 tab every 24 hours - to prevent stomach irritation/ulcers  -Senna 8.6mg - 2 pills every 24 hours - stool softener    Recommended to follow up with your primary care provider within 1-2 months of hospital discharge to discuss your recent surgery and any change to your medications.

## 2025-01-16 NOTE — BRIEF OPERATIVE NOTE - NSICDXBRIEFPROCEDURE_GEN_ALL_CORE_FT
PROCEDURES:  Release of trigger finger of left middle finger 16-Jan-2025 10:50:39  Demarco Verdin   PROCEDURES:  Release of trigger finger of left middle finger 16-Jan-2025 10:50:39  Demarco Verdin  Excision, cyst, hand 16-Jan-2025 11:17:57  Demarco Verdin

## 2025-01-16 NOTE — ASU DISCHARGE PLAN (ADULT/PEDIATRIC) - NS MD DC FALL RISK RISK
For information on Fall & Injury Prevention, visit: [email or web address] OR  [email or web address] OR  https://www.cdc.gov/steadi/patient.html

## 2025-01-17 RX ORDER — IBUPROFEN 800 MG/1
800 TABLET ORAL 3 TIMES DAILY
Qty: 90 | Refills: 0 | Status: ACTIVE | COMMUNITY
Start: 2025-01-17 | End: 1900-01-01

## 2025-01-20 DIAGNOSIS — Z87.891 PERSONAL HISTORY OF NICOTINE DEPENDENCE: ICD-10-CM

## 2025-01-20 DIAGNOSIS — K21.9 GASTRO-ESOPHAGEAL REFLUX DISEASE WITHOUT ESOPHAGITIS: ICD-10-CM

## 2025-01-20 DIAGNOSIS — I10 ESSENTIAL (PRIMARY) HYPERTENSION: ICD-10-CM

## 2025-01-20 DIAGNOSIS — E78.5 HYPERLIPIDEMIA, UNSPECIFIED: ICD-10-CM

## 2025-01-20 DIAGNOSIS — G43.909 MIGRAINE, UNSPECIFIED, NOT INTRACTABLE, WITHOUT STATUS MIGRAINOSUS: ICD-10-CM

## 2025-01-20 DIAGNOSIS — F32.A DEPRESSION, UNSPECIFIED: ICD-10-CM

## 2025-01-20 DIAGNOSIS — Z96.643 PRESENCE OF ARTIFICIAL HIP JOINT, BILATERAL: ICD-10-CM

## 2025-01-20 DIAGNOSIS — M65.332 TRIGGER FINGER, LEFT MIDDLE FINGER: ICD-10-CM

## 2025-01-20 DIAGNOSIS — J44.9 CHRONIC OBSTRUCTIVE PULMONARY DISEASE, UNSPECIFIED: ICD-10-CM

## 2025-01-20 DIAGNOSIS — Z90.710 ACQUIRED ABSENCE OF BOTH CERVIX AND UTERUS: ICD-10-CM

## 2025-01-20 DIAGNOSIS — Z86.718 PERSONAL HISTORY OF OTHER VENOUS THROMBOSIS AND EMBOLISM: ICD-10-CM

## 2025-01-20 DIAGNOSIS — Z91.041 RADIOGRAPHIC DYE ALLERGY STATUS: ICD-10-CM

## 2025-01-20 DIAGNOSIS — M19.90 UNSPECIFIED OSTEOARTHRITIS, UNSPECIFIED SITE: ICD-10-CM

## 2025-01-20 LAB — SURGICAL PATHOLOGY STUDY: SIGNIFICANT CHANGE UP

## 2025-01-28 ENCOUNTER — NON-APPOINTMENT (OUTPATIENT)
Age: 75
End: 2025-01-28

## 2025-01-28 ENCOUNTER — APPOINTMENT (OUTPATIENT)
Facility: CLINIC | Age: 75
End: 2025-01-28
Payer: MEDICARE

## 2025-01-28 ENCOUNTER — APPOINTMENT (OUTPATIENT)
Dept: ORTHOPEDIC SURGERY | Facility: CLINIC | Age: 75
End: 2025-01-28

## 2025-01-28 DIAGNOSIS — M65.332 TRIGGER FINGER, LEFT MIDDLE FINGER: ICD-10-CM

## 2025-01-28 PROBLEM — D64.9 ANEMIA, UNSPECIFIED: Chronic | Status: ACTIVE | Noted: 2025-01-03

## 2025-01-28 PROBLEM — I82.409 ACUTE EMBOLISM AND THROMBOSIS OF UNSPECIFIED DEEP VEINS OF UNSPECIFIED LOWER EXTREMITY: Chronic | Status: ACTIVE | Noted: 2025-01-03

## 2025-01-28 PROBLEM — G43.909 MIGRAINE, UNSPECIFIED, NOT INTRACTABLE, WITHOUT STATUS MIGRAINOSUS: Chronic | Status: ACTIVE | Noted: 2025-01-03

## 2025-01-28 PROBLEM — Z87.39 PERSONAL HISTORY OF OTHER DISEASES OF THE MUSCULOSKELETAL SYSTEM AND CONNECTIVE TISSUE: Chronic | Status: ACTIVE | Noted: 2025-01-03

## 2025-01-28 PROBLEM — F32.A DEPRESSION, UNSPECIFIED: Chronic | Status: ACTIVE | Noted: 2025-01-03

## 2025-01-28 PROBLEM — Z87.898 PERSONAL HISTORY OF OTHER SPECIFIED CONDITIONS: Chronic | Status: ACTIVE | Noted: 2025-01-03

## 2025-01-28 PROBLEM — R91.1 SOLITARY PULMONARY NODULE: Chronic | Status: ACTIVE | Noted: 2025-01-03

## 2025-01-28 PROBLEM — D47.2 MONOCLONAL GAMMOPATHY: Chronic | Status: ACTIVE | Noted: 2025-01-03

## 2025-01-28 PROBLEM — Z86.69 PERSONAL HISTORY OF OTHER DISEASES OF THE NERVOUS SYSTEM AND SENSE ORGANS: Chronic | Status: ACTIVE | Noted: 2025-01-03

## 2025-01-28 PROBLEM — M19.90 UNSPECIFIED OSTEOARTHRITIS, UNSPECIFIED SITE: Chronic | Status: ACTIVE | Noted: 2025-01-03

## 2025-01-28 PROBLEM — J44.9 CHRONIC OBSTRUCTIVE PULMONARY DISEASE, UNSPECIFIED: Chronic | Status: ACTIVE | Noted: 2025-01-03

## 2025-01-28 PROBLEM — Z87.891 PERSONAL HISTORY OF NICOTINE DEPENDENCE: Chronic | Status: ACTIVE | Noted: 2025-01-03

## 2025-01-28 PROBLEM — K21.9 GASTRO-ESOPHAGEAL REFLUX DISEASE WITHOUT ESOPHAGITIS: Chronic | Status: ACTIVE | Noted: 2025-01-03

## 2025-01-28 PROCEDURE — 76512 OPH US DX B-SCAN: CPT | Mod: LT

## 2025-01-28 PROCEDURE — 99214 OFFICE O/P EST MOD 30 MIN: CPT

## 2025-01-28 PROCEDURE — 99024 POSTOP FOLLOW-UP VISIT: CPT

## 2025-01-28 PROCEDURE — 92020 GONIOSCOPY: CPT

## 2025-01-28 PROCEDURE — 99204 OFFICE O/P NEW MOD 45 MIN: CPT

## 2025-01-28 PROCEDURE — 92250 FUNDUS PHOTOGRAPHY W/I&R: CPT

## 2025-02-21 ENCOUNTER — RX RENEWAL (OUTPATIENT)
Age: 75
End: 2025-02-21

## 2025-02-24 ENCOUNTER — OUTPATIENT (OUTPATIENT)
Dept: OUTPATIENT SERVICES | Facility: HOSPITAL | Age: 75
LOS: 1 days | End: 2025-02-24
Payer: MEDICARE

## 2025-02-24 ENCOUNTER — APPOINTMENT (OUTPATIENT)
Age: 75
End: 2025-02-24
Payer: MEDICARE

## 2025-02-24 VITALS
RESPIRATION RATE: 17 BRPM | BODY MASS INDEX: 26.06 KG/M2 | HEART RATE: 59 BPM | OXYGEN SATURATION: 98 % | TEMPERATURE: 98.2 F | DIASTOLIC BLOOD PRESSURE: 66 MMHG | SYSTOLIC BLOOD PRESSURE: 123 MMHG | WEIGHT: 166 LBS | HEIGHT: 67 IN

## 2025-02-24 DIAGNOSIS — Z98.890 OTHER SPECIFIED POSTPROCEDURAL STATES: Chronic | ICD-10-CM

## 2025-02-24 DIAGNOSIS — Z98.891 HISTORY OF UTERINE SCAR FROM PREVIOUS SURGERY: Chronic | ICD-10-CM

## 2025-02-24 DIAGNOSIS — Z96.641 PRESENCE OF RIGHT ARTIFICIAL HIP JOINT: Chronic | ICD-10-CM

## 2025-02-24 DIAGNOSIS — Z96.642 PRESENCE OF LEFT ARTIFICIAL HIP JOINT: Chronic | ICD-10-CM

## 2025-02-24 DIAGNOSIS — S62.524A NONDISPLACED FRACTURE OF DISTAL PHALANX OF RIGHT THUMB, INITIAL ENCOUNTER FOR CLOSED FRACTURE: ICD-10-CM

## 2025-02-24 DIAGNOSIS — Z90.710 ACQUIRED ABSENCE OF BOTH CERVIX AND UTERUS: Chronic | ICD-10-CM

## 2025-02-24 DIAGNOSIS — D47.2 MONOCLONAL GAMMOPATHY: ICD-10-CM

## 2025-02-24 LAB
AUTO BASOPHILS #: 0.04 K/UL
AUTO BASOPHILS %: 0.5 %
AUTO EOSINOPHILS #: 0.09 K/UL
AUTO EOSINOPHILS %: 1 %
AUTO IMMATURE GRANULOCYTES #: 0.03 K/UL
AUTO LYMPHOCYTES #: 1.28 K/UL
AUTO LYMPHOCYTES %: 14.7 %
AUTO MONOCYTES #: 0.63 K/UL
AUTO MONOCYTES %: 7.2 %
AUTO NEUTROPHILS #: 6.64 K/UL
AUTO NEUTROPHILS %: 76.3 %
AUTO NRBC #: 0 K/UL
HCT VFR BLD CALC: 37 %
HGB BLD-MCNC: 11.9 G/DL
IMM GRANULOCYTES NFR BLD AUTO: 0.3 %
MAN DIFF?: NORMAL
MCHC RBC-ENTMCNC: 28.5 PG
MCHC RBC-ENTMCNC: 32.2 G/DL
MCV RBC AUTO: 88.7 FL
PLATELET # BLD AUTO: 269 K/UL
PMV BLD AUTO: 0 /100 WBCS
PMV BLD: 9.2 FL
RBC # BLD: 4.17 M/UL
RBC # FLD: 14.4 %
WBC # FLD AUTO: 8.71 K/UL

## 2025-02-24 PROCEDURE — 86334 IMMUNOFIX E-PHORESIS SERUM: CPT

## 2025-02-24 PROCEDURE — 83540 ASSAY OF IRON: CPT

## 2025-02-24 PROCEDURE — 80053 COMPREHEN METABOLIC PANEL: CPT

## 2025-02-24 PROCEDURE — 99214 OFFICE O/P EST MOD 30 MIN: CPT

## 2025-02-24 PROCEDURE — 82607 VITAMIN B-12: CPT

## 2025-02-24 PROCEDURE — 84165 PROTEIN E-PHORESIS SERUM: CPT

## 2025-02-24 PROCEDURE — 82746 ASSAY OF FOLIC ACID SERUM: CPT

## 2025-02-24 PROCEDURE — 85025 COMPLETE CBC W/AUTO DIFF WBC: CPT

## 2025-02-24 PROCEDURE — 82728 ASSAY OF FERRITIN: CPT

## 2025-02-24 PROCEDURE — 83550 IRON BINDING TEST: CPT

## 2025-02-24 PROCEDURE — 84155 ASSAY OF PROTEIN SERUM: CPT

## 2025-02-25 DIAGNOSIS — S62.524A NONDISPLACED FRACTURE OF DISTAL PHALANX OF RIGHT THUMB, INITIAL ENCOUNTER FOR CLOSED FRACTURE: ICD-10-CM

## 2025-02-25 LAB
ALBUMIN SERPL ELPH-MCNC: 4.8 G/DL
ALP BLD-CCNC: 76 U/L
ALT SERPL-CCNC: 19 U/L
ANION GAP SERPL CALC-SCNC: 11 MMOL/L
AST SERPL-CCNC: 19 U/L
BILIRUB SERPL-MCNC: <0.2 MG/DL
BUN SERPL-MCNC: 14 MG/DL
CALCIUM SERPL-MCNC: 9.8 MG/DL
CHLORIDE SERPL-SCNC: 104 MMOL/L
CO2 SERPL-SCNC: 23 MMOL/L
CREAT SERPL-MCNC: 0.7 MG/DL
EGFR: 90 ML/MIN/1.73M2
FERRITIN SERPL-MCNC: 66 NG/ML
FOLATE SERPL-MCNC: >20 NG/ML
GLUCOSE SERPL-MCNC: 95 MG/DL
IRON SATN MFR SERPL: 24 %
IRON SERPL-MCNC: 69 UG/DL
POTASSIUM SERPL-SCNC: 4.5 MMOL/L
PROT SERPL-MCNC: 7 G/DL
SODIUM SERPL-SCNC: 138 MMOL/L
TIBC SERPL-MCNC: 289 UG/DL
UIBC SERPL-MCNC: 220 UG/DL
VIT B12 SERPL-MCNC: 1046 PG/ML

## 2025-02-26 LAB
ALBUMIN MFR SERPL ELPH: 62.4 %
ALBUMIN SERPL-MCNC: 4.4 G/DL
ALBUMIN/GLOB SERPL: 1.7 RATIO
ALPHA1 GLOB MFR SERPL ELPH: 4.4 %
ALPHA1 GLOB SERPL ELPH-MCNC: 0.3 G/DL
ALPHA2 GLOB MFR SERPL ELPH: 9.3 %
ALPHA2 GLOB SERPL ELPH-MCNC: 0.7 G/DL
B-GLOBULIN MFR SERPL ELPH: 9.2 %
B-GLOBULIN SERPL ELPH-MCNC: 0.6 G/DL
GAMMA GLOB FLD ELPH-MCNC: 1 G/DL
GAMMA GLOB MFR SERPL ELPH: 14.7 %
INTERPRETATION SERPL IEP-IMP: NORMAL
M PROTEIN MFR SERPL ELPH: 9.9 %
M PROTEIN SPEC IFE-MCNC: NORMAL
MONOCLON BAND OBS SERPL: 0.7 G/DL
PROT SERPL-MCNC: 7 G/DL
PROT SERPL-MCNC: 7 G/DL

## 2025-02-27 ENCOUNTER — APPOINTMENT (OUTPATIENT)
Dept: ORTHOPEDIC SURGERY | Facility: CLINIC | Age: 75
End: 2025-02-27
Payer: MEDICARE

## 2025-02-27 DIAGNOSIS — M65.332 TRIGGER FINGER, LEFT MIDDLE FINGER: ICD-10-CM

## 2025-02-27 PROCEDURE — 99024 POSTOP FOLLOW-UP VISIT: CPT

## 2025-04-15 ENCOUNTER — RX RENEWAL (OUTPATIENT)
Age: 75
End: 2025-04-15

## 2025-05-12 ENCOUNTER — EMERGENCY (EMERGENCY)
Facility: HOSPITAL | Age: 75
LOS: 0 days | Discharge: HOME | End: 2025-05-12
Attending: EMERGENCY MEDICINE
Payer: MEDICARE

## 2025-05-12 VITALS
HEIGHT: 64 IN | SYSTOLIC BLOOD PRESSURE: 180 MMHG | RESPIRATION RATE: 18 BRPM | OXYGEN SATURATION: 99 % | DIASTOLIC BLOOD PRESSURE: 77 MMHG | TEMPERATURE: 98 F | HEART RATE: 74 BPM | WEIGHT: 154.98 LBS

## 2025-05-12 DIAGNOSIS — T81.40XA INFECTION FOLLOWING A PROCEDURE, UNSPECIFIED, INITIAL ENCOUNTER: ICD-10-CM

## 2025-05-12 DIAGNOSIS — Z96.642 PRESENCE OF LEFT ARTIFICIAL HIP JOINT: Chronic | ICD-10-CM

## 2025-05-12 DIAGNOSIS — Z91.041 RADIOGRAPHIC DYE ALLERGY STATUS: ICD-10-CM

## 2025-05-12 DIAGNOSIS — Z98.890 OTHER SPECIFIED POSTPROCEDURAL STATES: Chronic | ICD-10-CM

## 2025-05-12 DIAGNOSIS — Z90.710 ACQUIRED ABSENCE OF BOTH CERVIX AND UTERUS: Chronic | ICD-10-CM

## 2025-05-12 DIAGNOSIS — Z98.891 HISTORY OF UTERINE SCAR FROM PREVIOUS SURGERY: Chronic | ICD-10-CM

## 2025-05-12 DIAGNOSIS — Z88.6 ALLERGY STATUS TO ANALGESIC AGENT: ICD-10-CM

## 2025-05-12 DIAGNOSIS — Z96.641 PRESENCE OF RIGHT ARTIFICIAL HIP JOINT: Chronic | ICD-10-CM

## 2025-05-12 PROCEDURE — 10060 I&D ABSCESS SIMPLE/SINGLE: CPT

## 2025-05-12 PROCEDURE — 99283 EMERGENCY DEPT VISIT LOW MDM: CPT | Mod: FS,25

## 2025-05-12 PROCEDURE — 99283 EMERGENCY DEPT VISIT LOW MDM: CPT | Mod: 25

## 2025-05-12 RX ORDER — DOXYCYCLINE HYCLATE 100 MG
1 TABLET ORAL
Qty: 14 | Refills: 0
Start: 2025-05-12 | End: 2025-05-18

## 2025-05-12 NOTE — ED PROVIDER NOTE - PHYSICAL EXAMINATION
Right hand right index finger lateral aspect there is a possible area that is fluctuant and pus is visible under the skin.  No erythema no swelling to the finger

## 2025-05-12 NOTE — ED PROVIDER NOTE - CLINICAL SUMMARY MEDICAL DECISION MAKING FREE TEXT BOX
Left wrist 74-year-old female presents with wound to right second digit lateral aspect sustained 2 weeks ago with knife while she was cooking.  Patient was seen in urgent care 1 week later (1 week ago) given Keflex twice daily patient here for wound check given care of purulence.  No systemic symptoms patient is not immunocompromise.  I&D performed with purulent output.  Antibiotics changed to doxycycline twice daily.  Patient to be discharged from ED in well appearing condition. Any available test results were discussed with and printed  for patient.  Verbal instructions given, including instructions to return to ED immediately for any new, worsening, or concerning symptoms. Limitations of ED work up discussed.  Patient reports understanding of above with capacity and insight. Written discharge instructions additionally given, including follow-up plan.

## 2025-05-12 NOTE — ED PROVIDER NOTE - PATIENT PORTAL LINK FT
You can access the FollowMyHealth Patient Portal offered by [facility] by registering at the following website: [email or web address]. By joining [facility]’s FollowMyHealth portal, you will also be able to view your health information using other applications (apps) compatible with our system.

## 2025-05-12 NOTE — ED PROVIDER NOTE - OBJECTIVE STATEMENT
74-year-old female presents to the ED for evaluation of wound check.  Patient has a wound to the right hand finger.  Patient states she sustained a cut went to urgent care a week later after she thought it was infected and was placed on Keflex.  Patient now noticed that there is some pus on the skin.

## 2025-05-19 ENCOUNTER — RX RENEWAL (OUTPATIENT)
Age: 75
End: 2025-05-19

## 2025-07-11 ENCOUNTER — APPOINTMENT (OUTPATIENT)
Dept: PULMONOLOGY | Facility: CLINIC | Age: 75
End: 2025-07-11
Payer: MEDICARE

## 2025-07-11 VITALS
SYSTOLIC BLOOD PRESSURE: 128 MMHG | BODY MASS INDEX: 24.28 KG/M2 | WEIGHT: 155 LBS | OXYGEN SATURATION: 89 % | DIASTOLIC BLOOD PRESSURE: 80 MMHG | RESPIRATION RATE: 15 BRPM | HEART RATE: 70 BPM

## 2025-07-11 PROCEDURE — G2211 COMPLEX E/M VISIT ADD ON: CPT

## 2025-07-11 PROCEDURE — 99213 OFFICE O/P EST LOW 20 MIN: CPT

## 2025-07-31 ENCOUNTER — APPOINTMENT (OUTPATIENT)
Facility: CLINIC | Age: 75
End: 2025-07-31

## 2025-08-01 ENCOUNTER — NON-APPOINTMENT (OUTPATIENT)
Age: 75
End: 2025-08-01

## 2025-08-26 ENCOUNTER — APPOINTMENT (OUTPATIENT)
Age: 75
End: 2025-08-26
Payer: MEDICARE

## 2025-08-26 VITALS
DIASTOLIC BLOOD PRESSURE: 70 MMHG | RESPIRATION RATE: 15 BRPM | TEMPERATURE: 98 F | WEIGHT: 153 LBS | HEIGHT: 67 IN | BODY MASS INDEX: 24.01 KG/M2 | SYSTOLIC BLOOD PRESSURE: 136 MMHG | OXYGEN SATURATION: 99 % | HEART RATE: 54 BPM

## 2025-08-26 DIAGNOSIS — D47.2 MONOCLONAL GAMMOPATHY: ICD-10-CM

## 2025-08-26 DIAGNOSIS — D64.9 ANEMIA, UNSPECIFIED: ICD-10-CM

## 2025-08-26 LAB
AUTO BASOPHILS #: 0.03 K/UL
AUTO BASOPHILS %: 0.6 %
AUTO EOSINOPHILS #: 0.11 K/UL
AUTO EOSINOPHILS %: 2 %
AUTO IMMATURE GRANULOCYTES #: 0.01 K/UL
AUTO LYMPHOCYTES #: 1.27 K/UL
AUTO LYMPHOCYTES %: 23.5 %
AUTO MONOCYTES #: 0.48 K/UL
AUTO MONOCYTES %: 8.9 %
AUTO NEUTROPHILS #: 3.51 K/UL
AUTO NEUTROPHILS %: 64.8 %
AUTO NRBC #: 0 K/UL
HCT VFR BLD CALC: 36.1 %
HGB BLD-MCNC: 11.7 G/DL
IMM GRANULOCYTES NFR BLD AUTO: 0.2 %
MAN DIFF?: NORMAL
MCHC RBC-ENTMCNC: 29 PG
MCHC RBC-ENTMCNC: 32.4 G/DL
MCV RBC AUTO: 89.6 FL
PLATELET # BLD AUTO: 246 K/UL
PMV BLD AUTO: 0 /100 WBCS
PMV BLD: 9 FL
RBC # BLD: 4.03 M/UL
RBC # FLD: 14 %
WBC # FLD AUTO: 5.41 K/UL

## 2025-08-26 PROCEDURE — 99213 OFFICE O/P EST LOW 20 MIN: CPT

## 2025-08-27 LAB
ALBUMIN SERPL ELPH-MCNC: 4.6 G/DL
ALP BLD-CCNC: 63 U/L
ALT SERPL-CCNC: 22 U/L
ANION GAP SERPL CALC-SCNC: 11 MMOL/L
AST SERPL-CCNC: 24 U/L
BILIRUB SERPL-MCNC: <0.2 MG/DL
BUN SERPL-MCNC: 12 MG/DL
CALCIUM SERPL-MCNC: 9.9 MG/DL
CHLORIDE SERPL-SCNC: 105 MMOL/L
CO2 SERPL-SCNC: 23 MMOL/L
CREAT SERPL-MCNC: 0.8 MG/DL
EGFRCR SERPLBLD CKD-EPI 2021: 76 ML/MIN/1.73M2
FERRITIN SERPL-MCNC: 64 NG/ML
FOLATE SERPL-MCNC: >20 NG/ML
GLUCOSE SERPL-MCNC: 91 MG/DL
POTASSIUM SERPL-SCNC: 5.2 MMOL/L
PROT SERPL-MCNC: 6.6 G/DL
SODIUM SERPL-SCNC: 139 MMOL/L
VIT B12 SERPL-MCNC: 553 PG/ML

## 2025-08-28 ENCOUNTER — APPOINTMENT (OUTPATIENT)
Facility: CLINIC | Age: 75
End: 2025-08-28

## 2025-08-28 PROBLEM — D47.2 MONOCLONAL GAMMOPATHIES: Status: ACTIVE | Noted: 2017-09-14

## 2025-08-29 LAB
ALBUMIN MFR SERPL ELPH: 63.1 %
ALBUMIN SERPL-MCNC: 4.2 G/DL
ALBUMIN/GLOB SERPL: 1.8 RATIO
ALPHA1 GLOB MFR SERPL ELPH: 4.2 %
ALPHA1 GLOB SERPL ELPH-MCNC: 0.3 G/DL
ALPHA2 GLOB MFR SERPL ELPH: 9.8 %
ALPHA2 GLOB SERPL ELPH-MCNC: 0.6 G/DL
B-GLOBULIN MFR SERPL ELPH: 8.7 %
B-GLOBULIN SERPL ELPH-MCNC: 0.6 G/DL
DEPRECATED KAPPA LC FREE/LAMBDA SER: 1.56 RATIO
GAMMA GLOB FLD ELPH-MCNC: 0.9 G/DL
GAMMA GLOB MFR SERPL ELPH: 14.2 %
IGA SERPL-MCNC: 67 MG/DL
IGG SERPL-MCNC: 1000 MG/DL
IGM SERPL-MCNC: 59 MG/DL
INTERPRETATION SERPL IEP-IMP: NORMAL
KAPPA LC CSF-MCNC: 0.62 MG/DL
KAPPA LC SERPL-MCNC: 0.97 MG/DL
M PROTEIN MFR SERPL ELPH: 8.7 %
M PROTEIN SPEC IFE-MCNC: NORMAL
MONOCLON BAND OBS SERPL: 0.6 G/DL
PROT SERPL-MCNC: 6.6 G/DL

## 2025-09-01 ENCOUNTER — RX RENEWAL (OUTPATIENT)
Age: 75
End: 2025-09-01

## 2025-09-15 ENCOUNTER — NON-APPOINTMENT (OUTPATIENT)
Age: 75
End: 2025-09-15

## 2025-09-15 ENCOUNTER — APPOINTMENT (OUTPATIENT)
Facility: CLINIC | Age: 75
End: 2025-09-15
Payer: MEDICARE

## 2025-09-15 PROCEDURE — 92250 FUNDUS PHOTOGRAPHY W/I&R: CPT

## 2025-09-15 PROCEDURE — 76512 OPH US DX B-SCAN: CPT | Mod: RT

## 2025-09-15 PROCEDURE — 99214 OFFICE O/P EST MOD 30 MIN: CPT
